# Patient Record
Sex: FEMALE | Race: WHITE | NOT HISPANIC OR LATINO
[De-identification: names, ages, dates, MRNs, and addresses within clinical notes are randomized per-mention and may not be internally consistent; named-entity substitution may affect disease eponyms.]

---

## 2018-07-23 PROBLEM — Z00.00 ENCOUNTER FOR PREVENTIVE HEALTH EXAMINATION: Status: ACTIVE | Noted: 2018-07-23

## 2018-07-24 ENCOUNTER — APPOINTMENT (OUTPATIENT)
Dept: CARDIOLOGY | Facility: CLINIC | Age: 68
End: 2018-07-24
Payer: MEDICARE

## 2018-07-24 VITALS — SYSTOLIC BLOOD PRESSURE: 124 MMHG | HEART RATE: 65 BPM | DIASTOLIC BLOOD PRESSURE: 72 MMHG

## 2018-07-24 DIAGNOSIS — E03.9 HYPOTHYROIDISM, UNSPECIFIED: ICD-10-CM

## 2018-07-24 DIAGNOSIS — E78.5 HYPERLIPIDEMIA, UNSPECIFIED: ICD-10-CM

## 2018-07-24 DIAGNOSIS — E78.1 PURE HYPERGLYCERIDEMIA: ICD-10-CM

## 2018-07-24 DIAGNOSIS — Z78.9 OTHER SPECIFIED HEALTH STATUS: ICD-10-CM

## 2018-07-24 DIAGNOSIS — Z82.49 FAMILY HISTORY OF ISCHEMIC HEART DISEASE AND OTHER DISEASES OF THE CIRCULATORY SYSTEM: ICD-10-CM

## 2018-07-24 PROCEDURE — 93000 ELECTROCARDIOGRAM COMPLETE: CPT

## 2018-07-24 PROCEDURE — 99204 OFFICE O/P NEW MOD 45 MIN: CPT

## 2018-07-24 RX ORDER — FENOFIBRATE 48 MG/1
48 TABLET ORAL
Qty: 30 | Refills: 5 | Status: ACTIVE | COMMUNITY
Start: 2018-07-24

## 2018-07-24 RX ORDER — ATORVASTATIN CALCIUM 40 MG/1
40 TABLET, FILM COATED ORAL DAILY
Qty: 90 | Refills: 3 | Status: ACTIVE | COMMUNITY
Start: 2018-07-24

## 2018-07-24 RX ORDER — LEVOTHYROXINE SODIUM 0.05 MG/1
50 TABLET ORAL
Qty: 30 | Refills: 5 | Status: ACTIVE | COMMUNITY
Start: 2018-07-24

## 2018-08-22 ENCOUNTER — APPOINTMENT (OUTPATIENT)
Dept: CARDIOLOGY | Facility: CLINIC | Age: 68
End: 2018-08-22
Payer: MEDICARE

## 2018-08-22 PROCEDURE — 93306 TTE W/DOPPLER COMPLETE: CPT

## 2018-09-11 ENCOUNTER — APPOINTMENT (OUTPATIENT)
Dept: CARDIOLOGY | Facility: CLINIC | Age: 68
End: 2018-09-11
Payer: MEDICARE

## 2018-09-11 VITALS — SYSTOLIC BLOOD PRESSURE: 123 MMHG | HEART RATE: 68 BPM | DIASTOLIC BLOOD PRESSURE: 71 MMHG

## 2018-09-11 DIAGNOSIS — R06.02 SHORTNESS OF BREATH: ICD-10-CM

## 2018-09-11 DIAGNOSIS — R94.31 ABNORMAL ELECTROCARDIOGRAM [ECG] [EKG]: ICD-10-CM

## 2018-09-11 DIAGNOSIS — E78.5 HYPERLIPIDEMIA, UNSPECIFIED: ICD-10-CM

## 2018-09-11 PROCEDURE — 99214 OFFICE O/P EST MOD 30 MIN: CPT

## 2018-09-21 ENCOUNTER — APPOINTMENT (OUTPATIENT)
Dept: CARDIOLOGY | Facility: CLINIC | Age: 68
End: 2018-09-21
Payer: MEDICARE

## 2018-09-21 PROCEDURE — 78452 HT MUSCLE IMAGE SPECT MULT: CPT

## 2018-09-21 PROCEDURE — 93015 CV STRESS TEST SUPVJ I&R: CPT

## 2018-09-21 PROCEDURE — A9500: CPT

## 2019-02-05 ENCOUNTER — APPOINTMENT (OUTPATIENT)
Dept: OBGYN | Facility: CLINIC | Age: 69
End: 2019-02-05
Payer: MEDICARE

## 2019-02-05 VITALS
DIASTOLIC BLOOD PRESSURE: 72 MMHG | SYSTOLIC BLOOD PRESSURE: 124 MMHG | WEIGHT: 169.25 LBS | HEIGHT: 67 IN | BODY MASS INDEX: 26.56 KG/M2

## 2019-02-05 DIAGNOSIS — N95.2 POSTMENOPAUSAL ATROPHIC VAGINITIS: ICD-10-CM

## 2019-02-05 DIAGNOSIS — Z87.42 PERSONAL HISTORY OF OTHER DISEASES OF THE FEMALE GENITAL TRACT: ICD-10-CM

## 2019-02-05 PROCEDURE — 99203 OFFICE O/P NEW LOW 30 MIN: CPT

## 2019-02-05 PROCEDURE — 82270 OCCULT BLOOD FECES: CPT

## 2019-02-05 NOTE — CHIEF COMPLAINT
[Initial Visit] : initial GYN visit [FreeTextEntry1] : Patient is a 68-year-old female who presents with complaints of an episode of light vaginal bleeding. Patient states the bleeding has stopped. This occurred approximately 2 weeks ago. Patient's last GYN exam was in the year 2000. Patient has no other complaints. Patient's last mammogram was July of 2018. And last bone density was years ago. In view of this complaint the patient was advised to undergo a workup and evaluation including a pelvic ultrasound, and office hysteroscopy and endometrial biopsy. And further treatment pending test results. Patient is agreeable and will be scheduled. Risks benefits and alternatives reviewed

## 2019-02-06 RX ORDER — MISOPROSTOL 200 UG/1
200 TABLET ORAL
Qty: 2 | Refills: 0 | Status: COMPLETED | COMMUNITY
Start: 2019-02-06 | End: 2019-02-08

## 2019-02-08 ENCOUNTER — FORM ENCOUNTER (OUTPATIENT)
Age: 69
End: 2019-02-08

## 2019-02-09 ENCOUNTER — APPOINTMENT (OUTPATIENT)
Dept: RADIOLOGY | Facility: CLINIC | Age: 69
End: 2019-02-09
Payer: MEDICARE

## 2019-02-09 ENCOUNTER — APPOINTMENT (OUTPATIENT)
Dept: ULTRASOUND IMAGING | Facility: CLINIC | Age: 69
End: 2019-02-09
Payer: MEDICARE

## 2019-02-09 ENCOUNTER — OUTPATIENT (OUTPATIENT)
Dept: OUTPATIENT SERVICES | Facility: HOSPITAL | Age: 69
LOS: 1 days | End: 2019-02-09
Payer: MEDICARE

## 2019-02-09 DIAGNOSIS — N95.2 POSTMENOPAUSAL ATROPHIC VAGINITIS: ICD-10-CM

## 2019-02-09 PROCEDURE — 77080 DXA BONE DENSITY AXIAL: CPT | Mod: 26

## 2019-02-09 PROCEDURE — 76856 US EXAM PELVIC COMPLETE: CPT | Mod: 26

## 2019-02-09 PROCEDURE — 76856 US EXAM PELVIC COMPLETE: CPT

## 2019-02-09 PROCEDURE — 76830 TRANSVAGINAL US NON-OB: CPT | Mod: 26

## 2019-02-09 PROCEDURE — 77080 DXA BONE DENSITY AXIAL: CPT

## 2019-02-09 PROCEDURE — 76830 TRANSVAGINAL US NON-OB: CPT

## 2019-02-11 ENCOUNTER — RESULT REVIEW (OUTPATIENT)
Age: 69
End: 2019-02-11

## 2019-02-11 LAB — CYTOLOGY CVX/VAG DOC THIN PREP: NORMAL

## 2019-03-05 ENCOUNTER — APPOINTMENT (OUTPATIENT)
Dept: OBGYN | Facility: CLINIC | Age: 69
End: 2019-03-05
Payer: MEDICARE

## 2019-03-05 VITALS
TEMPERATURE: 97.6 F | DIASTOLIC BLOOD PRESSURE: 80 MMHG | HEIGHT: 67 IN | SYSTOLIC BLOOD PRESSURE: 140 MMHG | WEIGHT: 169 LBS | BODY MASS INDEX: 26.53 KG/M2

## 2019-03-05 DIAGNOSIS — N95.0 POSTMENOPAUSAL BLEEDING: ICD-10-CM

## 2019-03-05 DIAGNOSIS — R93.5 ABNORMAL FINDINGS ON DIAGNOSTIC IMAGING OF OTHER ABDOMINAL REGIONS, INCLUDING RETROPERITONEUM: ICD-10-CM

## 2019-03-05 DIAGNOSIS — Z87.42 PERSONAL HISTORY OF OTHER DISEASES OF THE FEMALE GENITAL TRACT: ICD-10-CM

## 2019-03-05 PROCEDURE — 58558Z: CUSTOM

## 2019-03-05 NOTE — PROCEDURE
[Endometrial Biopsy] : Endometrial biopsy [Abnormal US] : abnormal ultrasound findings [Post-Menop. Bleeding] : post-menopausal bleeding [Risks] : risks [Benefits] : benefits [Alternatives] : alternatives [Patient] : patient [Infection] : infection [Bleeding] : bleeding [Allergic Reaction] : allergic reaction [Uterine Perforation] : uterine perforation [Pain] : pain [CONSENT OBTAINED] : written consent was obtained prior to the procedure. [Neg Pregnancy Test] : a pregnancy test was negative [Betadine] : Betadine [Paracervical Block] : A paracervical block was performed using [___ mL Injected] : [unfilled] ~UmL [1%] : 1% [Without Epi] : without epinephrine [Tenaculum] : a single toothed tenaculum [Required Dilation] : required dilation [Anteverted] : anteverted [Pipelle] : a Pipelle endometrial suction curette [Scant] : a scant [Sent to Histology] : the specimen was place in buffered formalin and sent for pathlogy [Tolerated Well] : the patient tolerated the procedure well [No Complications] : there were no complications [de-identified] : Cytotec [de-identified] : further treatment pending test results

## 2019-03-12 ENCOUNTER — TRANSCRIPTION ENCOUNTER (OUTPATIENT)
Age: 69
End: 2019-03-12

## 2019-03-12 ENCOUNTER — APPOINTMENT (OUTPATIENT)
Dept: OBGYN | Facility: CLINIC | Age: 69
End: 2019-03-12
Payer: MEDICARE

## 2019-03-12 VITALS
SYSTOLIC BLOOD PRESSURE: 110 MMHG | DIASTOLIC BLOOD PRESSURE: 70 MMHG | WEIGHT: 169 LBS | HEIGHT: 67 IN | BODY MASS INDEX: 26.53 KG/M2

## 2019-03-12 DIAGNOSIS — M85.851 OTHER SPECIFIED DISORDERS OF BONE DENSITY AND STRUCTURE, RIGHT THIGH: ICD-10-CM

## 2019-03-12 DIAGNOSIS — M85.852 OTHER SPECIFIED DISORDERS OF BONE DENSITY AND STRUCTURE, RIGHT THIGH: ICD-10-CM

## 2019-03-12 PROCEDURE — 99214 OFFICE O/P EST MOD 30 MIN: CPT

## 2019-03-12 NOTE — CHIEF COMPLAINT
[Follow Up] : follow up GYN visit [FreeTextEntry1] : Patient is a 68-year-old female who presents for consultation to discuss her bone density DEXA results. Patient's T score and the spine was -0.1 which is considered to be normal. Patient's T score and the left hip was -2.3 in the femoral neck and -2.4 total hip area it is consistent with severe osteopenia. These results were discussed with the patient in detail and included the following recommendations: Adequate calcium and vitamin D supplementation, weight-bearing exercise, fall prevention, bone metabolic lab evaluation, and consideration for medical therapy with a bisphosphonate. Patient's FRAX calculation is 13% for a 10 year major fracture and 2.9% her hip fracture. Discuss these numbers as well. Patient did decide on whether or not she would like to start medical therapy. Instructions and precautions reviewed, followup bone density recommended in one to 2 years.\par \par 30 minutes face time

## 2019-03-12 NOTE — COUNSELING
[Nutrition] : nutrition [Exercise] : exercise [Vitamins/Supplements] : vitamins/supplements [Lab Results] : lab results [Medication Management] : medication management

## 2019-03-13 ENCOUNTER — TRANSCRIPTION ENCOUNTER (OUTPATIENT)
Age: 69
End: 2019-03-13

## 2019-03-13 LAB
25(OH)D3 SERPL-MCNC: 12.5 NG/ML
ANION GAP SERPL CALC-SCNC: 11 MMOL/L
BUN SERPL-MCNC: 20 MG/DL
CALCIUM SERPL-MCNC: 9.5 MG/DL
CHLORIDE SERPL-SCNC: 103 MMOL/L
CO2 SERPL-SCNC: 27 MMOL/L
CREAT SERPL-MCNC: 0.89 MG/DL
GLUCOSE SERPL-MCNC: 98 MG/DL
POTASSIUM SERPL-SCNC: 4.7 MMOL/L
SODIUM SERPL-SCNC: 141 MMOL/L
TSH SERPL-ACNC: 6.05 UIU/ML

## 2019-03-18 ENCOUNTER — TRANSCRIPTION ENCOUNTER (OUTPATIENT)
Age: 69
End: 2019-03-18

## 2019-03-18 LAB — HUNTINGTON HOSPITAL LAB BIOPSY: NORMAL

## 2019-03-19 ENCOUNTER — RX RENEWAL (OUTPATIENT)
Age: 69
End: 2019-03-19

## 2019-03-21 LAB — PTH RELATED PROT SERPL-MCNC: <2 PMOL/L

## 2019-03-22 ENCOUNTER — FORM ENCOUNTER (OUTPATIENT)
Age: 69
End: 2019-03-22

## 2019-03-23 ENCOUNTER — OUTPATIENT (OUTPATIENT)
Dept: OUTPATIENT SERVICES | Facility: HOSPITAL | Age: 69
LOS: 1 days | End: 2019-03-23
Payer: MEDICARE

## 2019-03-23 ENCOUNTER — APPOINTMENT (OUTPATIENT)
Dept: MRI IMAGING | Facility: CLINIC | Age: 69
End: 2019-03-23
Payer: MEDICARE

## 2019-03-23 DIAGNOSIS — C54.1 MALIGNANT NEOPLASM OF ENDOMETRIUM: ICD-10-CM

## 2019-03-23 DIAGNOSIS — Z00.8 ENCOUNTER FOR OTHER GENERAL EXAMINATION: ICD-10-CM

## 2019-03-23 PROCEDURE — A9585: CPT

## 2019-03-23 PROCEDURE — 72197 MRI PELVIS W/O & W/DYE: CPT

## 2019-03-23 PROCEDURE — 72197 MRI PELVIS W/O & W/DYE: CPT | Mod: 26

## 2019-03-25 ENCOUNTER — RX RENEWAL (OUTPATIENT)
Age: 69
End: 2019-03-25

## 2019-03-26 ENCOUNTER — CLINICAL ADVICE (OUTPATIENT)
Age: 69
End: 2019-03-26

## 2019-03-27 ENCOUNTER — APPOINTMENT (OUTPATIENT)
Dept: GYNECOLOGIC ONCOLOGY | Facility: CLINIC | Age: 69
End: 2019-03-27
Payer: MEDICARE

## 2019-03-27 DIAGNOSIS — Z80.3 FAMILY HISTORY OF MALIGNANT NEOPLASM OF BREAST: ICD-10-CM

## 2019-03-27 DIAGNOSIS — Z82.49 FAMILY HISTORY OF ISCHEMIC HEART DISEASE AND OTHER DISEASES OF THE CIRCULATORY SYSTEM: ICD-10-CM

## 2019-03-27 PROCEDURE — 99205 OFFICE O/P NEW HI 60 MIN: CPT

## 2019-03-27 NOTE — CHIEF COMPLAINT
[FreeTextEntry1] : [place] [facility]\par \par [facility] [phone] at [address] [place] [address]. [place] [id number]

## 2019-03-27 NOTE — ASSESSMENT
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 Physical examination significant for leukoplakia noted near perianal area on perianal body, at 12 o'clock and between 7-8 o'clock measuring 5mm. There was an associated loss of labia minora noted suspicious for lichen sclerosis. Internal examination was normal.\par \par I discussed at length with the patient the nature, purpose, risks, benefits, and alternatives of Robot assisted total laparoscopic hysterectomy with bilateral salpingo-oophorectomy, retroperitoneal lymphadenectomy.  The patient understands the risks to include,but not be limited to, bowel injury, bleeding (with the possible need for transfusion), bladder or ureteral injury, infections, deep venous thrombosis, and que-operative death.  The patient also understands that her surgery may not be able to be performed robotically and that she may need a laparotomy.  She also understands the limitations of robotic surgery and the possibility of missing a surgical complication with need for subsequent re-exploration.  She agrees to proceed.  She asked numerous questions which were answered to her satisfaction.  She understands the need for a pre-operative bowel preparation and agrees to comply with our instructions.  She also understands the rationale for a cystoscopy at the completion of the procedure and the potential risks of cystoscopy.  The patient also understands the possible limitations of robotic staging compared to a laparotomy approach.\par \par I also discussed given her physical exam findings significant for lichen sclerosis and leukoplakia, I am concerned that there is a possibility of being at risk for a vulvar cancer, for which I am recommending wide local excision and possible vulvar biopsies. With this procedure I discussed the risk of bleeding, post operative pain and infection. \par

## 2019-03-27 NOTE — HISTORY OF PRESENT ILLNESS
[FreeTextEntry1] : This 69 y/o  female, LMP at age 53 being referred by Dr. Campuzano for abnormal EMB pathology. Patient presented in 2019 with a episode of light vaginal bleeding in the last week of 2018 for a few days with some mild associated cramping. 2019 EMB significant for moderately differentiated adenocarcinoma. P16 positive and Vimentin, ER, [place] negative. These findings favor an endocervical primary over an endometrial primary. Recommend clinical correlation. Patient denies changes in her bowels/bladder habits. MRI pelvis revealed small amount of fluid within the endometrial cavity, no endometrial thickening is evident.  Uterus measuring 6.7 x 3.4 x 4.4cm. Denies changes in her bowel/bladder habits. \par \par PAP – 2019 Negative for intraepithelial lesion or malignancy, atrophy \par Mammogram - 2018; normal as per patient \par Colonoscopy - < 5 years ago; normal as per patient \par Family History significant for breast cancer in her sister, who passed away at age 48.

## 2019-03-27 NOTE — PHYSICAL EXAM
[Normal] : Bimanual Exam: Normal [Abnormal] : External genitalia: Abnormal [de-identified] : leukoplakia noted near perianal area on perianal body, at 12 o'clock and between 7-8 o'clock measuring 5mm,\par loss of labia minora noted suspicious for lichen sclerosis   [de-identified] : Patient was interviewed and examined with chaperone present. Name of Chaperone: Sania Mccarthy PA-C

## 2019-03-27 NOTE — PAST MEDICAL HISTORY
[Postmenopausal] : The patient is postmenopausal [Menarche Age ____] : age at menarche was [unfilled] [Regular Cycle Intervals] : have been regular

## 2019-04-16 ENCOUNTER — OUTPATIENT (OUTPATIENT)
Dept: OUTPATIENT SERVICES | Facility: HOSPITAL | Age: 69
LOS: 1 days | End: 2019-04-16
Payer: MEDICARE

## 2019-04-16 VITALS
SYSTOLIC BLOOD PRESSURE: 118 MMHG | WEIGHT: 169.76 LBS | HEART RATE: 58 BPM | RESPIRATION RATE: 16 BRPM | HEIGHT: 66 IN | DIASTOLIC BLOOD PRESSURE: 68 MMHG | TEMPERATURE: 96 F

## 2019-04-16 DIAGNOSIS — Z90.89 ACQUIRED ABSENCE OF OTHER ORGANS: Chronic | ICD-10-CM

## 2019-04-16 DIAGNOSIS — E78.00 PURE HYPERCHOLESTEROLEMIA, UNSPECIFIED: ICD-10-CM

## 2019-04-16 DIAGNOSIS — C54.1 MALIGNANT NEOPLASM OF ENDOMETRIUM: ICD-10-CM

## 2019-04-16 DIAGNOSIS — Z01.818 ENCOUNTER FOR OTHER PREPROCEDURAL EXAMINATION: ICD-10-CM

## 2019-04-16 DIAGNOSIS — E03.9 HYPOTHYROIDISM, UNSPECIFIED: ICD-10-CM

## 2019-04-16 DIAGNOSIS — Z96.651 PRESENCE OF RIGHT ARTIFICIAL KNEE JOINT: Chronic | ICD-10-CM

## 2019-04-16 DIAGNOSIS — Z29.9 ENCOUNTER FOR PROPHYLACTIC MEASURES, UNSPECIFIED: ICD-10-CM

## 2019-04-16 DIAGNOSIS — Z98.51 TUBAL LIGATION STATUS: Chronic | ICD-10-CM

## 2019-04-16 LAB
ANION GAP SERPL CALC-SCNC: 12 MMOL/L — SIGNIFICANT CHANGE UP (ref 5–17)
BASOPHILS # BLD AUTO: 0 K/UL — SIGNIFICANT CHANGE UP (ref 0–0.2)
BASOPHILS NFR BLD AUTO: 0.4 % — SIGNIFICANT CHANGE UP (ref 0–2)
BLD GP AB SCN SERPL QL: SIGNIFICANT CHANGE UP
BUN SERPL-MCNC: 20 MG/DL — SIGNIFICANT CHANGE UP (ref 8–20)
CALCIUM SERPL-MCNC: 9.6 MG/DL — SIGNIFICANT CHANGE UP (ref 8.6–10.2)
CANCER AG125 SERPL-ACNC: 9 U/ML — SIGNIFICANT CHANGE UP
CHLORIDE SERPL-SCNC: 103 MMOL/L — SIGNIFICANT CHANGE UP (ref 98–107)
CO2 SERPL-SCNC: 25 MMOL/L — SIGNIFICANT CHANGE UP (ref 22–29)
CREAT SERPL-MCNC: 0.73 MG/DL — SIGNIFICANT CHANGE UP (ref 0.5–1.3)
EOSINOPHIL # BLD AUTO: 0.2 K/UL — SIGNIFICANT CHANGE UP (ref 0–0.5)
EOSINOPHIL NFR BLD AUTO: 4.6 % — SIGNIFICANT CHANGE UP (ref 0–6)
GLUCOSE SERPL-MCNC: 102 MG/DL — SIGNIFICANT CHANGE UP (ref 70–115)
HBA1C BLD-MCNC: 5.1 % — SIGNIFICANT CHANGE UP (ref 4–5.6)
HCT VFR BLD CALC: 41.5 % — SIGNIFICANT CHANGE UP (ref 37–47)
HGB BLD-MCNC: 13.4 G/DL — SIGNIFICANT CHANGE UP (ref 12–16)
LYMPHOCYTES # BLD AUTO: 1.4 K/UL — SIGNIFICANT CHANGE UP (ref 1–4.8)
LYMPHOCYTES # BLD AUTO: 30.6 % — SIGNIFICANT CHANGE UP (ref 20–55)
MCHC RBC-ENTMCNC: 31.8 PG — HIGH (ref 27–31)
MCHC RBC-ENTMCNC: 32.3 G/DL — SIGNIFICANT CHANGE UP (ref 32–36)
MCV RBC AUTO: 98.3 FL — SIGNIFICANT CHANGE UP (ref 81–99)
MONOCYTES # BLD AUTO: 0.3 K/UL — SIGNIFICANT CHANGE UP (ref 0–0.8)
MONOCYTES NFR BLD AUTO: 6.3 % — SIGNIFICANT CHANGE UP (ref 3–10)
NEUTROPHILS # BLD AUTO: 2.8 K/UL — SIGNIFICANT CHANGE UP (ref 1.8–8)
NEUTROPHILS NFR BLD AUTO: 58.1 % — SIGNIFICANT CHANGE UP (ref 37–73)
PLATELET # BLD AUTO: 230 K/UL — SIGNIFICANT CHANGE UP (ref 150–400)
POTASSIUM SERPL-MCNC: 4.5 MMOL/L — SIGNIFICANT CHANGE UP (ref 3.5–5.3)
POTASSIUM SERPL-SCNC: 4.5 MMOL/L — SIGNIFICANT CHANGE UP (ref 3.5–5.3)
RBC # BLD: 4.22 M/UL — LOW (ref 4.4–5.2)
RBC # FLD: 13.4 % — SIGNIFICANT CHANGE UP (ref 11–15.6)
SODIUM SERPL-SCNC: 140 MMOL/L — SIGNIFICANT CHANGE UP (ref 135–145)
TYPE + AB SCN PNL BLD: SIGNIFICANT CHANGE UP
WBC # BLD: 4.7 K/UL — LOW (ref 4.8–10.8)
WBC # FLD AUTO: 4.7 K/UL — LOW (ref 4.8–10.8)

## 2019-04-16 PROCEDURE — 85027 COMPLETE CBC AUTOMATED: CPT

## 2019-04-16 PROCEDURE — 86901 BLOOD TYPING SEROLOGIC RH(D): CPT

## 2019-04-16 PROCEDURE — 93005 ELECTROCARDIOGRAM TRACING: CPT

## 2019-04-16 PROCEDURE — 83036 HEMOGLOBIN GLYCOSYLATED A1C: CPT

## 2019-04-16 PROCEDURE — 86850 RBC ANTIBODY SCREEN: CPT

## 2019-04-16 PROCEDURE — 86304 IMMUNOASSAY TUMOR CA 125: CPT

## 2019-04-16 PROCEDURE — 86900 BLOOD TYPING SEROLOGIC ABO: CPT

## 2019-04-16 PROCEDURE — G0463: CPT

## 2019-04-16 PROCEDURE — 80048 BASIC METABOLIC PNL TOTAL CA: CPT

## 2019-04-16 PROCEDURE — 36415 COLL VENOUS BLD VENIPUNCTURE: CPT

## 2019-04-16 PROCEDURE — 93010 ELECTROCARDIOGRAM REPORT: CPT

## 2019-04-16 NOTE — H&P PST ADULT - MALLAMPATI CLASS
Occupational Therapy Cancellation Notice     OT orders received and chart review completed-Attempted to see pt for OT evaluation today however pt currently declining to participate in therapy session  Pt reported she attempted to get OOB yesterday and stated "it didn't go well " At this time pt would like to visit w/ her family and does not want therapy services  Will continue to follow and attempt to see pt at another time when agreeable      Sandee Larios MS, OTR/L Class II - visualization of the soft palate, fauces, and uvula

## 2019-04-16 NOTE — H&P PST ADULT - NSICDXFAMILYHX_GEN_ALL_CORE_FT
FAMILY HISTORY:  Family history of hypertension  FH: breast cancer, sister  FH: lymphoma, father and brother

## 2019-04-16 NOTE — H&P PST ADULT - ASSESSMENT
medications reviewed, instructions given on what medications to take and what not to take.   CAPRINI VTE 2.0 SCORE [CLOT updated 2019]    AGE RELATED RISK FACTORS                                                       MOBILITY RELATED FACTORS  [ ] Age 41-60 years                                            (1 Point)                    [ ] Bed rest                                                        (1 Point)  [x ] Age: 61-74 years                                           (2 Points)                  [ ] Plaster cast                                                   (2 Points)  [ ] Age= 75 years                                              (3 Points)                    [ ] Bed bound for more than 72 hours                 (2 Points)  x  DISEASE RELATED RISK FACTORS                                               GENDER SPECIFIC FACTORS  [ ] Edema in the lower extremities                       (1 Point)              [ ] Pregnancy                                                     (1 Point)  [ ] Varicose veins                                               (1 Point)                     [ ] Post-partum < 6 weeks                                   (1 Point)             [x ] BMI > 25 Kg/m2                                            (1 Point)                     [ ] Hormonal therapy  or oral contraception          (1 Point)                 [ ] Sepsis (in the previous month)                        (1 Point)               [ ] History of pregnancy complications                 (1 point)  [ ] Pneumonia or serious lung disease                                               [ ] Unexplained or recurrent                     (1 Point)           (in the previous month)                               (1 Point)  [ ] Abnormal pulmonary function test                     (1 Point)                 SURGERY RELATED RISK FACTORS  [ ] Acute myocardial infarction                              (1 Point)               [ ]  Section                                             (1 Point)  [ ] Congestive heart failure (in the previous month)  (1 Point)      [ ] Minor surgery                                                  (1 Point)   [ ] Inflammatory bowel disease                             (1 Point)               [ ] Arthroscopic surgery                                        (2 Points)  [ ] Central venous access                                      (2 Points)                [x ] General surgery lasting more than 45 minutes (2 points)  [ x] Malignancy- Present or previous                   (2 Points)                [ ] Elective arthroplasty                                         (5 points)    [ ] Stroke (in the previous month)                          (5 Points)                                                                                                                                                           HEMATOLOGY RELATED FACTORS                                                 TRAUMA RELATED RISK FACTORS  [ ] Prior episodes of VTE                                     (3 Points)                [ ] Fracture of the hip, pelvis, or leg                       (5 Points)  [ ] Positive family history for VTE                         (3 Points)             [ ] Acute spinal cord injury (in the previous month)  (5 Points)  [ ] Prothrombin 30505 A                                     (3 Points)               [ ] Paralysis  (less than 1 month)                             (5 Points)  [ ] Factor V Leiden                                             (3 Points)                  [ ] Multiple Trauma within 1 month                        (5 Points)  [ ] Lupus anticoagulants                                     (3 Points)                                                           [ ] Anticardiolipin antibodies                               (3 Points)                                                       [ ] High homocysteine in the blood                      (3 Points)                                             [ ] Other congenital or acquired thrombophilia      (3 Points)                                                [ ] Heparin induced thrombocytopenia                  (3 Points)                                     Total Score [  6        ]  OPIOID RISK TOOL    NATASHA EACH BOX THAT APPLIES AND ADD TOTALS AT THE END    FAMILY HISTORY OF SUBSTANCE ABUSE                 FEMALE         MALE                                                Alcohol                             [  ]1 pt          [  ]3pts                                               Illegal Drugs                     [  ]2 pts        [  ]3pts                                               Rx Drugs                           [  ]4 pts        [  ]4 pts    PERSONAL HISTORY OF SUBSTANCE ABUSE                                                                                          Alcohol                             [  ]3 pts       [  ]3 pts                                               Illegal Drugs                     [  ]4 pts        [  ]4 pts                                               Rx Drugs                           [  ]5 pts        [  ]5 pts    AGE BETWEEN 16-45 YEARS                                      [  ]1 pt         [  ]1 pt    HISTORY OF PREADOLESCENT   SEXUAL ABUSE                                                             [  ]3 pts        [  ]0pts    PSYCHOLOGICAL DISEASE                     ADD, OCD, Bipolar, Schizophrenia        [  ]2 pts         [  ]2 pts                      Depression                                               [  ]1 pt           [  ]1 pt           SCORING TOTAL   (add numbers and type here)              ( 0)                                     A score of 3 or lower indicated LOW risk for future opioid abuse  A score of 4 to 7 indicated moderate risk for future opioid abuse  A score of 8 or higher indicates a high risk for opioid abuse

## 2019-04-16 NOTE — H&P PST ADULT - NSICDXPROBLEM_GEN_ALL_CORE_FT
PROBLEM DIAGNOSES  Problem: Need for prophylactic measure  Assessment and Plan: Caprini Score 6 High risk,  Surgical team should assess /Strongly recommend pharmacological and mechanical measures for VTE prophylaxis     Problem: Hypothyroidism  Assessment and Plan: continue medications as instructed.     Problem: High cholesterol  Assessment and Plan: continue medications as instructed.     Problem: Endometrium cancer  Assessment and Plan: Robotic assisted total laparoscopic hysterectomy bilateral salpingo oophorectomy sentinel lymph node dissections cystoscopy. Medical Clearance pending

## 2019-04-16 NOTE — H&P PST ADULT - NSICDXPASTSURGICALHX_GEN_ALL_CORE_FT
PAST SURGICAL HISTORY:  H/O total knee replacement, right 2015    H/O tubal ligation     History of tonsillectomy

## 2019-04-16 NOTE — H&P PST ADULT - HISTORY OF PRESENT ILLNESS
70 year old female 68 year old female who states that she has endometrial cancer which was diagnosed in early Jan 2019, she noticed some post menopausal bloody discharge in late December which leads to all the work up.

## 2019-04-16 NOTE — H&P PST ADULT - NSANTHOSAYNRD_GEN_A_CORE
No. MIKO screening performed.  STOP BANG Legend: 0-2 = LOW Risk; 3-4 = INTERMEDIATE Risk; 5-8 = HIGH Risk

## 2019-04-23 ENCOUNTER — RESULT REVIEW (OUTPATIENT)
Age: 69
End: 2019-04-23

## 2019-04-23 ENCOUNTER — OUTPATIENT (OUTPATIENT)
Dept: INPATIENT UNIT | Facility: HOSPITAL | Age: 69
LOS: 1 days | End: 2019-04-23
Payer: MEDICARE

## 2019-04-23 VITALS
HEART RATE: 67 BPM | HEIGHT: 67 IN | TEMPERATURE: 98 F | DIASTOLIC BLOOD PRESSURE: 72 MMHG | OXYGEN SATURATION: 99 % | WEIGHT: 169.98 LBS | SYSTOLIC BLOOD PRESSURE: 125 MMHG | RESPIRATION RATE: 16 BRPM

## 2019-04-23 DIAGNOSIS — Z90.89 ACQUIRED ABSENCE OF OTHER ORGANS: Chronic | ICD-10-CM

## 2019-04-23 DIAGNOSIS — Z98.51 TUBAL LIGATION STATUS: Chronic | ICD-10-CM

## 2019-04-23 DIAGNOSIS — C54.1 MALIGNANT NEOPLASM OF ENDOMETRIUM: ICD-10-CM

## 2019-04-23 DIAGNOSIS — Z96.651 PRESENCE OF RIGHT ARTIFICIAL KNEE JOINT: Chronic | ICD-10-CM

## 2019-04-23 LAB
ABO RH CONFIRMATION: SIGNIFICANT CHANGE UP
GLUCOSE BLDC GLUCOMTR-MCNC: 124 MG/DL — HIGH (ref 70–99)
GLUCOSE BLDC GLUCOMTR-MCNC: 81 MG/DL — SIGNIFICANT CHANGE UP (ref 70–99)
GLUCOSE BLDC GLUCOMTR-MCNC: 92 MG/DL — SIGNIFICANT CHANGE UP (ref 70–99)

## 2019-04-23 PROCEDURE — 57425 LAPAROSCOPY SURG COLPOPEXY: CPT | Mod: AS

## 2019-04-23 PROCEDURE — 88309 TISSUE EXAM BY PATHOLOGIST: CPT | Mod: 26

## 2019-04-23 PROCEDURE — 58571 TLH W/T/O 250 G OR LESS: CPT | Mod: 59

## 2019-04-23 PROCEDURE — 38572 LAPAROSCOPY LYMPHADENECTOMY: CPT | Mod: AS,59

## 2019-04-23 PROCEDURE — 88341 IMHCHEM/IMCYTCHM EA ADD ANTB: CPT | Mod: 26

## 2019-04-23 PROCEDURE — 58571 TLH W/T/O 250 G OR LESS: CPT | Mod: AS,59

## 2019-04-23 PROCEDURE — 88342 IMHCHEM/IMCYTCHM 1ST ANTB: CPT | Mod: 26

## 2019-04-23 PROCEDURE — 38900 IO MAP OF SENT LYMPH NODE: CPT | Mod: AS

## 2019-04-23 PROCEDURE — 57425 LAPAROSCOPY SURG COLPOPEXY: CPT

## 2019-04-23 PROCEDURE — 88305 TISSUE EXAM BY PATHOLOGIST: CPT | Mod: 26

## 2019-04-23 PROCEDURE — 38572 LAPAROSCOPY LYMPHADENECTOMY: CPT | Mod: 59

## 2019-04-23 PROCEDURE — 88307 TISSUE EXAM BY PATHOLOGIST: CPT | Mod: 26

## 2019-04-23 PROCEDURE — 56605 BIOPSY OF VULVA/PERINEUM: CPT | Mod: 59

## 2019-04-23 PROCEDURE — 38900 IO MAP OF SENT LYMPH NODE: CPT | Mod: 50

## 2019-04-23 RX ORDER — ERGOCALCIFEROL 1.25 MG/1
1 CAPSULE ORAL
Qty: 0 | Refills: 0 | COMMUNITY

## 2019-04-23 RX ORDER — OXYCODONE AND ACETAMINOPHEN 5; 325 MG/1; MG/1
1 TABLET ORAL EVERY 4 HOURS
Qty: 0 | Refills: 0 | Status: DISCONTINUED | OUTPATIENT
Start: 2019-04-23 | End: 2019-04-23

## 2019-04-23 RX ORDER — FENOFIBRATE,MICRONIZED 130 MG
1 CAPSULE ORAL
Qty: 0 | Refills: 0 | COMMUNITY

## 2019-04-23 RX ORDER — LEVOTHYROXINE SODIUM 125 MCG
50 TABLET ORAL DAILY
Qty: 0 | Refills: 0 | Status: DISCONTINUED | OUTPATIENT
Start: 2019-04-23 | End: 2019-05-08

## 2019-04-23 RX ORDER — ATORVASTATIN CALCIUM 80 MG/1
40 TABLET, FILM COATED ORAL AT BEDTIME
Qty: 0 | Refills: 0 | Status: DISCONTINUED | OUTPATIENT
Start: 2019-04-23 | End: 2019-05-08

## 2019-04-23 RX ORDER — FENTANYL CITRATE 50 UG/ML
25 INJECTION INTRAVENOUS
Qty: 0 | Refills: 0 | Status: DISCONTINUED | OUTPATIENT
Start: 2019-04-23 | End: 2019-04-23

## 2019-04-23 RX ORDER — DEXTROSE MONOHYDRATE, SODIUM CHLORIDE, AND POTASSIUM CHLORIDE 50; .745; 4.5 G/1000ML; G/1000ML; G/1000ML
1000 INJECTION, SOLUTION INTRAVENOUS
Qty: 0 | Refills: 0 | Status: DISCONTINUED | OUTPATIENT
Start: 2019-04-23 | End: 2019-05-08

## 2019-04-23 RX ORDER — OXYCODONE AND ACETAMINOPHEN 5; 325 MG/1; MG/1
2 TABLET ORAL EVERY 6 HOURS
Qty: 0 | Refills: 0 | Status: DISCONTINUED | OUTPATIENT
Start: 2019-04-23 | End: 2019-04-23

## 2019-04-23 RX ORDER — ONDANSETRON 8 MG/1
4 TABLET, FILM COATED ORAL EVERY 6 HOURS
Qty: 0 | Refills: 0 | Status: DISCONTINUED | OUTPATIENT
Start: 2019-04-23 | End: 2019-05-08

## 2019-04-23 RX ORDER — ONDANSETRON 8 MG/1
4 TABLET, FILM COATED ORAL ONCE
Qty: 0 | Refills: 0 | Status: DISCONTINUED | OUTPATIENT
Start: 2019-04-23 | End: 2019-04-23

## 2019-04-23 RX ORDER — ATORVASTATIN CALCIUM 80 MG/1
1 TABLET, FILM COATED ORAL
Qty: 0 | Refills: 0 | COMMUNITY

## 2019-04-23 RX ORDER — FENOFIBRATE,MICRONIZED 130 MG
48 CAPSULE ORAL DAILY
Qty: 0 | Refills: 0 | Status: DISCONTINUED | OUTPATIENT
Start: 2019-04-23 | End: 2019-05-08

## 2019-04-23 RX ORDER — ENOXAPARIN SODIUM 100 MG/ML
40 INJECTION SUBCUTANEOUS
Qty: 28 | Refills: 0 | OUTPATIENT
Start: 2019-04-23 | End: 2019-05-20

## 2019-04-23 RX ORDER — SODIUM CHLORIDE 9 MG/ML
1000 INJECTION, SOLUTION INTRAVENOUS
Qty: 0 | Refills: 0 | Status: DISCONTINUED | OUTPATIENT
Start: 2019-04-23 | End: 2019-04-23

## 2019-04-23 RX ORDER — HYDROMORPHONE HYDROCHLORIDE 2 MG/ML
0.25 INJECTION INTRAMUSCULAR; INTRAVENOUS; SUBCUTANEOUS
Qty: 0 | Refills: 0 | Status: DISCONTINUED | OUTPATIENT
Start: 2019-04-23 | End: 2019-04-23

## 2019-04-23 RX ORDER — SODIUM CHLORIDE 9 MG/ML
3 INJECTION INTRAMUSCULAR; INTRAVENOUS; SUBCUTANEOUS ONCE
Qty: 0 | Refills: 0 | Status: DISCONTINUED | OUTPATIENT
Start: 2019-04-23 | End: 2019-04-23

## 2019-04-23 RX ORDER — LEVOTHYROXINE SODIUM 125 MCG
1 TABLET ORAL
Qty: 0 | Refills: 0 | COMMUNITY

## 2019-04-23 RX ORDER — ENOXAPARIN SODIUM 100 MG/ML
40 INJECTION SUBCUTANEOUS EVERY 24 HOURS
Qty: 0 | Refills: 0 | Status: DISCONTINUED | OUTPATIENT
Start: 2019-04-24 | End: 2019-05-08

## 2019-04-23 RX ORDER — CEFOTETAN DISODIUM 1 G
2 VIAL (EA) INJECTION ONCE
Qty: 0 | Refills: 0 | Status: COMPLETED | OUTPATIENT
Start: 2019-04-23 | End: 2019-04-23

## 2019-04-23 RX ADMIN — HYDROMORPHONE HYDROCHLORIDE 0.25 MILLIGRAM(S): 2 INJECTION INTRAMUSCULAR; INTRAVENOUS; SUBCUTANEOUS at 21:30

## 2019-04-23 RX ADMIN — HYDROMORPHONE HYDROCHLORIDE 0.25 MILLIGRAM(S): 2 INJECTION INTRAMUSCULAR; INTRAVENOUS; SUBCUTANEOUS at 20:59

## 2019-04-23 RX ADMIN — HYDROMORPHONE HYDROCHLORIDE 0.25 MILLIGRAM(S): 2 INJECTION INTRAMUSCULAR; INTRAVENOUS; SUBCUTANEOUS at 21:16

## 2019-04-23 RX ADMIN — OXYCODONE AND ACETAMINOPHEN 1 TABLET(S): 5; 325 TABLET ORAL at 21:27

## 2019-04-23 RX ADMIN — Medication 100 GRAM(S): at 17:56

## 2019-04-23 NOTE — PROGRESS NOTE ADULT - ASSESSMENT
69yo F with hypothyroid and endometrial cancer, POD 0 s/p RA TLH BSO, PPALND, cysto, no active postoperative issues, recovering well.     Cancer type: Endometrial cancer suspected. Pending final pathology   Cardiac: No cardiac issues, BP well controlled   Pulmonary: no active disease, incentive spirometer at bedside.   Neuro: pain well controlled with current regimen.   Endo: Continue home medications for hypothyroid disease.   GI: Regular diet available, advance at tolerated.   : Rodriguez removed intraoperatively, due to void.   Heme: SCDs when not ambulating, Lovenox for VTE prophylaxis. Continue with AM labs.   FEN: IVF at 125cc/hr, will discontinue once PO intake is adequate. Will continue AM labs.

## 2019-04-23 NOTE — PROGRESS NOTE ADULT - SUBJECTIVE AND OBJECTIVE BOX
GYNECOLOGIC ONCOLOGY PROGRESS NOTE    POD# 0    PROBLEMS:  Hypothyroid  Hyperlipidemia   Endometrial Cancer      Pt seen and examined at bedside.     SUBJECTIVE:    Patient is without complaints.  Pain well-controlled with PRN medications  Flatus: N  Denies Nausea, Vomiting or Diarrhea.  Denies shortness of breath, chest pain or dyspnea on exertion.  NPO since she just recently woke up from anesthesia.   No hemphill in place. Not voiding.       OBJECTIVE:     VITALS:  T(F): 97.2 (04-23-19 @ 19:48), Max: 97.9 (04-23-19 @ 13:25)  HR: 75 (04-23-19 @ 20:45) (67 - 88)  BP: 102/48 (04-23-19 @ 20:45) (102/48 - 128/68)  RR: 13 (04-23-19 @ 20:45) (10 - 17)  SpO2: 98% (04-23-19 @ 20:45) (96% - 100%) on Oxygen at 2L NC  Wt(kg): --    I&O's Summary  No hemphill in place  Not ambulating     MEDICATIONS  (STANDING):  lactated ringers. 1000 milliLiter(s) (125 mL/Hr) IV Continuous <Continuous>    MEDICATIONS  (PRN):  fentaNYL    Injectable 25 MICROGram(s) IV Push every 5 minutes PRN Moderate Pain (4 - 6)  HYDROmorphone  Injectable 0.25 milliGRAM(s) IV Push every 10 minutes PRN Moderate Pain (4 - 6)  ondansetron Injectable 4 milliGRAM(s) IV Push once PRN Nausea and/or Vomiting      Physical Exam:  Constitutional: NAD  Pulmonary: clear to auscultation bilaterally   Cardiovascular: Regular rate and rhythm   Abdomen: soft, non-tender, non-distended, normal bowel signs  Extremities: no lower extremity edema or calve tenderness, Gina's sign negative.  Incision: Clean, dry, intact.  Without signs of infection or hernia.      LABS: Will wait for AM labs.

## 2019-04-24 ENCOUNTER — TRANSCRIPTION ENCOUNTER (OUTPATIENT)
Age: 69
End: 2019-04-24

## 2019-04-24 VITALS
HEART RATE: 55 BPM | TEMPERATURE: 99 F | DIASTOLIC BLOOD PRESSURE: 66 MMHG | SYSTOLIC BLOOD PRESSURE: 110 MMHG | OXYGEN SATURATION: 96 % | RESPIRATION RATE: 18 BRPM

## 2019-04-24 DIAGNOSIS — C54.1 MALIGNANT NEOPLASM OF ENDOMETRIUM: ICD-10-CM

## 2019-04-24 LAB
ANION GAP SERPL CALC-SCNC: 12 MMOL/L — SIGNIFICANT CHANGE UP (ref 5–17)
BUN SERPL-MCNC: 9 MG/DL — SIGNIFICANT CHANGE UP (ref 8–20)
CALCIUM SERPL-MCNC: 8.6 MG/DL — SIGNIFICANT CHANGE UP (ref 8.6–10.2)
CHLORIDE SERPL-SCNC: 106 MMOL/L — SIGNIFICANT CHANGE UP (ref 98–107)
CO2 SERPL-SCNC: 22 MMOL/L — SIGNIFICANT CHANGE UP (ref 22–29)
CREAT SERPL-MCNC: 0.65 MG/DL — SIGNIFICANT CHANGE UP (ref 0.5–1.3)
EOSINOPHIL # BLD AUTO: 0 K/UL — SIGNIFICANT CHANGE UP (ref 0–0.5)
EOSINOPHIL NFR BLD AUTO: 0 % — SIGNIFICANT CHANGE UP (ref 0–6)
GLUCOSE SERPL-MCNC: 170 MG/DL — HIGH (ref 70–115)
HCT VFR BLD CALC: 38.3 % — SIGNIFICANT CHANGE UP (ref 37–47)
HGB BLD-MCNC: 12.4 G/DL — SIGNIFICANT CHANGE UP (ref 12–16)
LYMPHOCYTES # BLD AUTO: 0.5 K/UL — LOW (ref 1–4.8)
LYMPHOCYTES # BLD AUTO: 8 % — LOW (ref 20–55)
MAGNESIUM SERPL-MCNC: 1.9 MG/DL — SIGNIFICANT CHANGE UP (ref 1.6–2.6)
MCHC RBC-ENTMCNC: 31.6 PG — HIGH (ref 27–31)
MCHC RBC-ENTMCNC: 32.4 G/DL — SIGNIFICANT CHANGE UP (ref 32–36)
MCV RBC AUTO: 97.5 FL — SIGNIFICANT CHANGE UP (ref 81–99)
MONOCYTES # BLD AUTO: 0.3 K/UL — SIGNIFICANT CHANGE UP (ref 0–0.8)
MONOCYTES NFR BLD AUTO: 4.3 % — SIGNIFICANT CHANGE UP (ref 3–10)
NEUTROPHILS # BLD AUTO: 5.5 K/UL — SIGNIFICANT CHANGE UP (ref 1.8–8)
NEUTROPHILS NFR BLD AUTO: 87.5 % — HIGH (ref 37–73)
PLATELET # BLD AUTO: 194 K/UL — SIGNIFICANT CHANGE UP (ref 150–400)
POTASSIUM SERPL-MCNC: 5.1 MMOL/L — SIGNIFICANT CHANGE UP (ref 3.5–5.3)
POTASSIUM SERPL-SCNC: 5.1 MMOL/L — SIGNIFICANT CHANGE UP (ref 3.5–5.3)
RBC # BLD: 3.93 M/UL — LOW (ref 4.4–5.2)
RBC # FLD: 13.3 % — SIGNIFICANT CHANGE UP (ref 11–15.6)
SODIUM SERPL-SCNC: 140 MMOL/L — SIGNIFICANT CHANGE UP (ref 135–145)
WBC # BLD: 6.3 K/UL — SIGNIFICANT CHANGE UP (ref 4.8–10.8)
WBC # FLD AUTO: 6.3 K/UL — SIGNIFICANT CHANGE UP (ref 4.8–10.8)

## 2019-04-24 PROCEDURE — S2900: CPT

## 2019-04-24 PROCEDURE — 83735 ASSAY OF MAGNESIUM: CPT

## 2019-04-24 PROCEDURE — 56605 BIOPSY OF VULVA/PERINEUM: CPT

## 2019-04-24 PROCEDURE — 85027 COMPLETE CBC AUTOMATED: CPT

## 2019-04-24 PROCEDURE — 88342 IMHCHEM/IMCYTCHM 1ST ANTB: CPT

## 2019-04-24 PROCEDURE — 38572 LAPAROSCOPY LYMPHADENECTOMY: CPT | Mod: 50

## 2019-04-24 PROCEDURE — 88307 TISSUE EXAM BY PATHOLOGIST: CPT

## 2019-04-24 PROCEDURE — 82962 GLUCOSE BLOOD TEST: CPT

## 2019-04-24 PROCEDURE — 80048 BASIC METABOLIC PNL TOTAL CA: CPT

## 2019-04-24 PROCEDURE — 36415 COLL VENOUS BLD VENIPUNCTURE: CPT

## 2019-04-24 PROCEDURE — 57283 COLPOPEXY INTRAPERITONEAL: CPT

## 2019-04-24 PROCEDURE — 88305 TISSUE EXAM BY PATHOLOGIST: CPT

## 2019-04-24 PROCEDURE — 88309 TISSUE EXAM BY PATHOLOGIST: CPT

## 2019-04-24 PROCEDURE — 58571 TLH W/T/O 250 G OR LESS: CPT

## 2019-04-24 PROCEDURE — 88341 IMHCHEM/IMCYTCHM EA ADD ANTB: CPT

## 2019-04-24 RX ORDER — DOCUSATE SODIUM 100 MG
1 CAPSULE ORAL
Qty: 15 | Refills: 0 | OUTPATIENT
Start: 2019-04-24 | End: 2019-04-28

## 2019-04-24 RX ADMIN — Medication 500 MILLIGRAM(S): at 06:21

## 2019-04-24 RX ADMIN — ENOXAPARIN SODIUM 40 MILLIGRAM(S): 100 INJECTION SUBCUTANEOUS at 06:20

## 2019-04-24 RX ADMIN — Medication 500 MILLIGRAM(S): at 06:23

## 2019-04-24 RX ADMIN — Medication 50 MICROGRAM(S): at 06:21

## 2019-04-24 RX ADMIN — DEXTROSE MONOHYDRATE, SODIUM CHLORIDE, AND POTASSIUM CHLORIDE 125 MILLILITER(S): 50; .745; 4.5 INJECTION, SOLUTION INTRAVENOUS at 00:10

## 2019-04-24 NOTE — PROGRESS NOTE ADULT - SUBJECTIVE AND OBJECTIVE BOX
GYNECOLOGIC ONCOLOGY PROGRESS NOTE    POD#1    PROBLEMS:  Endometrial Cancer, hypothyroid, HLD.     Pt seen and examined at bedside.     SUBJECTIVE:    Patient is without complaints.  Pain well-controlled.  Denies Nausea, Vomiting or Diarrhea.  Denies shortness of breath, chest pain or dyspnea on exertion.  Tolerating diet.  Denies dizziness, weakness or fatigue.     OBJECTIVE:     VITALS:  T(F): 97.7 (04-24-19 @ 04:16), Max: 98.2 (04-24-19 @ 00:07)  HR: 52 (04-24-19 @ 04:16) (52 - 88)  BP: 112/56 (04-24-19 @ 04:16) (102/48 - 128/68)  RR: 18 (04-24-19 @ 04:16) (10 - 18)  SpO2: 96% (04-24-19 @ 04:16) (95% - 100%)    I&O's Summary    Rodriguez removed in OR, voided 450cc's since removal at 20:30.     MEDICATIONS  (STANDING):  atorvastatin 40 milliGRAM(s) Oral at bedtime  dextrose 5% + sodium chloride 0.45% with potassium chloride 20 mEq/L 1000 milliLiter(s) (125 mL/Hr) IV Continuous <Continuous>  enoxaparin Injectable 40 milliGRAM(s) SubCutaneous every 24 hours  fenofibrate Tablet 48 milliGRAM(s) Oral daily  levothyroxine 50 MICROGram(s) Oral daily  naproxen 500 milliGRAM(s) Oral every 12 hours    MEDICATIONS  (PRN):  ondansetron Injectable 4 milliGRAM(s) IV Push every 6 hours PRN Postoperative Nausea and/or Vomiting  oxyCODONE    5 mG/acetaminophen 325 mG 1 Tablet(s) Oral every 4 hours PRN Moderate Pain (4 - 6)  oxyCODONE    5 mG/acetaminophen 325 mG 2 Tablet(s) Oral every 6 hours PRN Severe Pain (7 - 10)      Physical Exam:  Constitutional: NAD  Pulmonary: clear to auscultation bilaterally   Cardiovascular: Regular rate and rhythm   Abdomen: soft, non-tender, non-distended, normal bowel sounds  Extremities: no lower extremity edema or calve tenderness.  Incision: Clean, dry, intact.  Without signs of infection or hernia.      LABS:  AM labs pending.

## 2019-04-24 NOTE — DISCHARGE NOTE PROVIDER - NSDCFUADDAPPT_GEN_ALL_CORE_FT
Follow-up with Dr. Aguilar in two weeks to review pathology report.   Please contact your provider for any pain uncontrolled by medication, excessive bleeding or Fever>100.4  Please take Naprosyn 1 tablet every 12 hours x 4 days, may take percocet as prescribed for breakthrough pain.   Please take Colace as needed to avoid straining with bowel movements.

## 2019-04-24 NOTE — DISCHARGE NOTE PROVIDER - HOSPITAL COURSE
Patient post-operatively had an uncomplicated hospital course. Her pain was well controlled. She is tolerating a regular diet. She is ambulating independently. She was able to void after removal of hemphill. Labs and Vitals WNL upon discharge.

## 2019-04-24 NOTE — DISCHARGE NOTE PROVIDER - CARE PROVIDER_API CALL
Ellen Aguilar)  [facility]  [address] [place] [address]  Phone: [phone]  Fax: [phone]  Follow Up Time:

## 2019-04-24 NOTE — PROGRESS NOTE ADULT - PROBLEM SELECTOR PLAN 1
Dc to home pending normal labs  dc instructions discussed with patient  all questions answered  Scripts for naproxen and percocet provided  Patient will see Dr. Aguilar in office in 2 weeks    All of the above discussed with Dr. Aguilar Dc to home pending normal labs  Dc instructions discussed with patient  All questions answered  Scripts for naproxen and percocet provided  Script for Lovenox x 28 days for DVT prophylaxis  Patient will see Dr. Aguilar in office in 2 weeks    All of the above discussed with Dr. Aguilar

## 2019-04-24 NOTE — DISCHARGE NOTE NURSING/CASE MANAGEMENT/SOCIAL WORK - NSDCDPATPORTLINK_GEN_ALL_CORE
You can access the [facility], offered by [facility], by registering with the following website: [email or web address] [email or web address]

## 2019-04-24 NOTE — DISCHARGE NOTE PROVIDER - INSTRUCTIONS
Continue regular diet as tolerated.    May walk and climb stairs as often as you would like, no vigorous activity, do not lift anything greater than 10lbs, nothing per vagina x 6 weeks, do not drive while on pain medication.

## 2019-04-25 PROBLEM — E78.00 PURE HYPERCHOLESTEROLEMIA, UNSPECIFIED: Chronic | Status: ACTIVE | Noted: 2019-04-16

## 2019-04-25 PROBLEM — E03.9 HYPOTHYROIDISM, UNSPECIFIED: Chronic | Status: ACTIVE | Noted: 2019-04-16

## 2019-04-30 LAB — SURGICAL PATHOLOGY STUDY: SIGNIFICANT CHANGE UP

## 2019-05-08 ENCOUNTER — APPOINTMENT (OUTPATIENT)
Dept: GYNECOLOGIC ONCOLOGY | Facility: CLINIC | Age: 69
End: 2019-05-08
Payer: COMMERCIAL

## 2019-05-08 VITALS — HEIGHT: 67 IN | WEIGHT: 170 LBS | BODY MASS INDEX: 26.68 KG/M2

## 2019-05-08 PROCEDURE — 99024 POSTOP FOLLOW-UP VISIT: CPT

## 2019-05-08 NOTE — ASSESSMENT
[FreeTextEntry1] : Pt is a 69 yo s/p TRH, BSO, SLND for presumed endometrial cancer, which turns out to be an endometioid adenocarcinoma Stage IB. Margins are negative, but after TB presentation recommendation is adjuvant radiation treatment as the parametrial margins are close and she did not have a radical hysterectomy. No evidence of lymphatic spread.

## 2019-05-08 NOTE — DISCUSSION/SUMMARY
[Clean] : was clean [Dry] : was dry [Erythema] : was not erythematous [Intact] : was intact [None] : had no drainage [Ecchymosis] : was not ecchymotic [Seroma] : had no seroma [Normal Skin] : normal appearance [Firm] : soft [Tender] : nontender [Abnormal Bowel Sounds] : normal bowel sounds [Rebound] : no rebound tenderness [Guarding] : no guarding [Incisional Hernia] : no incisional hernia [Mass] : no palpable mass [Doing Well] : is doing well [Excellent Pain Control] : has excellent pain control [No Sign of Infection] : is showing no signs of infection [0] : 0

## 2019-05-08 NOTE — REASON FOR VISIT
[Post Op] : post op visit [de-identified] : TRH, BSO, SLND [de-identified] : 4/23/2019 [de-identified] : Pt presents for first post-op follow up. She reports doing very well overall. She is still doing Lovenox injections. She has a dentist appointment for extraction on the 5/25 but will be done with injections by 22nd. She reports no vaginal bleeding, no pain, no nausea/vomiting.

## 2019-05-08 NOTE — END OF VISIT
[FreeTextEntry3] : RTC in 4 weeks for cuff check\par Referral to Dr. Cleaning for radiation\par Referral to Dr. Shaikh for radiosensitizing chemo

## 2019-05-10 ENCOUNTER — APPOINTMENT (OUTPATIENT)
Dept: RADIATION ONCOLOGY | Facility: CLINIC | Age: 69
End: 2019-05-10
Payer: MEDICARE

## 2019-05-10 ENCOUNTER — OUTPATIENT (OUTPATIENT)
Dept: OUTPATIENT SERVICES | Facility: HOSPITAL | Age: 69
LOS: 1 days | Discharge: HOME | End: 2019-05-10
Payer: MEDICARE

## 2019-05-10 VITALS
TEMPERATURE: 97.8 F | OXYGEN SATURATION: 98 % | HEART RATE: 61 BPM | WEIGHT: 172 LBS | BODY MASS INDEX: 27 KG/M2 | RESPIRATION RATE: 16 BRPM | HEIGHT: 67 IN | DIASTOLIC BLOOD PRESSURE: 67 MMHG | SYSTOLIC BLOOD PRESSURE: 124 MMHG

## 2019-05-10 DIAGNOSIS — Z98.51 TUBAL LIGATION STATUS: Chronic | ICD-10-CM

## 2019-05-10 DIAGNOSIS — Z96.651 PRESENCE OF RIGHT ARTIFICIAL KNEE JOINT: Chronic | ICD-10-CM

## 2019-05-10 DIAGNOSIS — Z80.7 FAMILY HISTORY OF OTHER MALIGNANT NEOPLASMS OF LYMPHOID, HEMATOPOIETIC AND RELATED TISSUES: ICD-10-CM

## 2019-05-10 DIAGNOSIS — Z90.89 ACQUIRED ABSENCE OF OTHER ORGANS: Chronic | ICD-10-CM

## 2019-05-10 PROCEDURE — 99205 OFFICE O/P NEW HI 60 MIN: CPT | Mod: 25,GC

## 2019-05-10 PROCEDURE — 77263 THER RADIOLOGY TX PLNG CPLX: CPT

## 2019-05-10 NOTE — PHYSICAL EXAM
[Bowel Sounds] : normal bowel sounds [Abdomen Soft] : soft [Abdomen Tenderness] : non-tender [Normal Vaginal Cuff] : vaginal cuff without lesion or nodularity [Normal] : normal spine exam without palpable tenderness, no kyphosis or scoliosis [de-identified] : well healed surgical scars  [de-identified] : no masses

## 2019-05-10 NOTE — HISTORY OF PRESENT ILLNESS
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 \par \par In 12/2018 she noted light vaginal bleeding with mild cramping. Transvaginal ultrasound on 02/11/19 showed moderate amount of fluid within the endometrial cavity, PAP on 2/2019 was negative.  2/2019 endometrial biopsy showed moderately differentiated adenocarcinoma. P16 positive, ER, OK negative. \par \par She was referred to Dr. Aguilar and underwent an MRI Pelvis on 3/26/19 which showed small amount of fluid within the endometrial cavity. No endometrial thickening is evident; recently diagnosed cancer is not visible on MR. No pelvic adenopathy. On 4/23/19 Dr. Aguilar perfomed a TRH, BSO, SLND for presumed endometrial cancer.  Pathology showed a 2.2 cm grade 1 adenocarcinoma of the endocervix involving the entire circumference of the cervix and infiltrates to a depth of 0.7 cm out of 0.8 cm wall thickness. Negative parametria, ovaries, tubes.  Vulva biopsy 6 o'clock showed lichen sclerosus. 0/4 negative lymph nodes (2 negative left external iliac nodes, 1 negative right external iliac node, 1 negative para-aortic node). Radial margin < 1 mm.  [id number]  FIGO IB2. Her case was presented at tumor board and the recommendation was for adjuvant radiation treatment.\par \par Today, she feels well, denies vaginal bleeding, pain, itch, discharge, edema, bowel, urine, pain, fatigue & weight loss. She lives alone but performs her ADLs.

## 2019-05-10 NOTE — REVIEW OF SYSTEMS
[Negative] : Allergic/Immunologic [Constipation: Grade 0] : Constipation: Grade 0 [Diarrhea: Grade 0] : Diarrhea: Grade 0 [Edema Limbs: Grade 0] : Edema Limbs: Grade 0  [Fatigue: Grade 0] : Fatigue: Grade 0 [Localized Edema: Grade 1 - Localized to dependent areas, no disability or functional impairment] : Localized Edema: Grade 1 - Localized to dependent areas, no disability or functional impairment [Neck Edema: Grade 0] : Neck Edema: Grade 0 [Hematuria: Grade 0] : Hematuria: Grade 0 [Urinary Incontinence: Grade 0] : Urinary Incontinence: Grade 0  [Urinary Retention: Grade 0] : Urinary Retention: Grade 0 [Urinary Tract Pain: Grade 0] : Urinary Tract Pain: Grade 0 [Urinary Urgency: Grade 0] : Urinary Urgency: Grade 0 [Urinary Frequency: Grade 0] : Urinary Frequency: Grade 0 [FreeTextEntry4] : pelvis

## 2019-05-10 NOTE — DISEASE MANAGEMENT
[Pathological] : TNM Stage: p [IB] : IB [FreeTextEntry4] : adenocarcinoma endocervix [TTNM] : 1b1 [NTNM] : 0 [MTNM] : 0

## 2019-05-10 NOTE — OB/GYN HISTORY
[History of Birth Control Pills] : Patient has a history of taking birth control pills [Currently In Menopause] : currently in menopause [Menopause Age: ____] : patient was [unfilled] years old at menopause [___] : Living: [unfilled] [History of Hormone Replacement Therapy] : no history of hormone replacement therapy [Experiencing Menopausal Sxs] : not experiencing menopausal symptoms

## 2019-05-10 NOTE — LETTER CLOSING
[Consult Closing:] : Thank you for allowing me to participate in the care of this patient.  If you have any questions, please do not hesitate to contact me. [Sincerely yours,] : Sincerely yours, [FreeTextEntry3] : Jagdish Cleaning MD\par Attending Physician\par Department of Radiation Medicine\par [facility] [place], [facility]\par \par \par [employer] \par [employer] [facility]\par

## 2019-05-10 NOTE — VITALS
[Maximal Pain Intensity: 0/10] : 0/10 [Least Pain Intensity: 0/10] : 0/10 [Date: ____________] : Patient's last distress assessment performed on [unfilled]. [Patient Refusal] : Patient refused psychosocial distress assessment [80: Normal activity with effort; some signs or symptoms of disease.] : 80: Normal activity with effort; some signs or symptoms of disease.  [ECOG Performance Status: 1 - Restricted in physically strenuous activity but ambulatory and able to carry out work of a light or sedentary nature] : Performance Status: 1 - Restricted in physically strenuous activity but ambulatory and able to carry out work of a light or sedentary nature, e.g., light house work, office work

## 2019-05-21 ENCOUNTER — OUTPATIENT (OUTPATIENT)
Dept: OUTPATIENT SERVICES | Facility: HOSPITAL | Age: 69
LOS: 1 days | Discharge: HOME | End: 2019-05-21
Payer: MEDICARE

## 2019-05-21 DIAGNOSIS — Z96.651 PRESENCE OF RIGHT ARTIFICIAL KNEE JOINT: Chronic | ICD-10-CM

## 2019-05-21 DIAGNOSIS — Z98.51 TUBAL LIGATION STATUS: Chronic | ICD-10-CM

## 2019-05-21 DIAGNOSIS — C54.1 MALIGNANT NEOPLASM OF ENDOMETRIUM: ICD-10-CM

## 2019-05-21 DIAGNOSIS — Z90.89 ACQUIRED ABSENCE OF OTHER ORGANS: Chronic | ICD-10-CM

## 2019-05-22 ENCOUNTER — APPOINTMENT (OUTPATIENT)
Dept: HEMATOLOGY ONCOLOGY | Facility: CLINIC | Age: 69
End: 2019-05-22
Payer: MEDICARE

## 2019-05-22 ENCOUNTER — RESULT REVIEW (OUTPATIENT)
Age: 69
End: 2019-05-22

## 2019-05-22 VITALS
HEIGHT: 67 IN | WEIGHT: 172 LBS | HEART RATE: 55 BPM | BODY MASS INDEX: 27 KG/M2 | SYSTOLIC BLOOD PRESSURE: 128 MMHG | OXYGEN SATURATION: 96 % | TEMPERATURE: 98.1 F | DIASTOLIC BLOOD PRESSURE: 83 MMHG

## 2019-05-22 LAB
BASOPHILS # BLD AUTO: 0.1 K/UL — SIGNIFICANT CHANGE UP (ref 0–0.2)
BASOPHILS NFR BLD AUTO: 1.5 % — SIGNIFICANT CHANGE UP (ref 0–2)
EOSINOPHIL # BLD AUTO: 0.3 K/UL — SIGNIFICANT CHANGE UP (ref 0–0.5)
EOSINOPHIL NFR BLD AUTO: 6.7 % — HIGH (ref 0–6)
HCT VFR BLD CALC: 38.4 % — SIGNIFICANT CHANGE UP (ref 34.5–45)
HGB BLD-MCNC: 12.7 G/DL — SIGNIFICANT CHANGE UP (ref 11.5–15.5)
LYMPHOCYTES # BLD AUTO: 1.5 K/UL — SIGNIFICANT CHANGE UP (ref 1–3.3)
LYMPHOCYTES # BLD AUTO: 34.9 % — SIGNIFICANT CHANGE UP (ref 13–44)
MCHC RBC-ENTMCNC: 32.4 PG — SIGNIFICANT CHANGE UP (ref 27–34)
MCHC RBC-ENTMCNC: 33 G/DL — SIGNIFICANT CHANGE UP (ref 32–36)
MCV RBC AUTO: 98.4 FL — SIGNIFICANT CHANGE UP (ref 80–100)
MONOCYTES # BLD AUTO: 0.3 K/UL — SIGNIFICANT CHANGE UP (ref 0–0.9)
MONOCYTES NFR BLD AUTO: 8 % — SIGNIFICANT CHANGE UP (ref 2–14)
NEUTROPHILS # BLD AUTO: 2.1 K/UL — SIGNIFICANT CHANGE UP (ref 1.8–7.4)
NEUTROPHILS NFR BLD AUTO: 48.8 % — SIGNIFICANT CHANGE UP (ref 43–77)
PLATELET # BLD AUTO: 240 K/UL — SIGNIFICANT CHANGE UP (ref 150–400)
RBC # BLD: 3.91 M/UL — SIGNIFICANT CHANGE UP (ref 3.8–5.2)
RBC # FLD: 13 % — SIGNIFICANT CHANGE UP (ref 10.3–14.5)
WBC # BLD: 4.3 K/UL — SIGNIFICANT CHANGE UP (ref 3.8–10.5)
WBC # FLD AUTO: 4.3 K/UL — SIGNIFICANT CHANGE UP (ref 3.8–10.5)

## 2019-05-22 PROCEDURE — 77338 DESIGN MLC DEVICE FOR IMRT: CPT | Mod: 26

## 2019-05-22 PROCEDURE — 99205 OFFICE O/P NEW HI 60 MIN: CPT

## 2019-05-22 PROCEDURE — 77300 RADIATION THERAPY DOSE PLAN: CPT | Mod: 26

## 2019-05-22 PROCEDURE — 77301 RADIOTHERAPY DOSE PLAN IMRT: CPT | Mod: 26

## 2019-05-22 NOTE — ASSESSMENT
[Curative] : Goals of care discussed with patient: Curative [FreeTextEntry1] : For women with early-stage cervical cancer treated with a primary surgical approach, adjuvant therapy should be administered if final pathologic findings suggest they are at risk for disease recurrence based on surgical findings and disease stage.   Observation is appropriate for patients with stage IA2, IB, or IIA1 disease who have negative nodes, negative margins, negative parametria and no cervical risk factors after radical hysterectomy (Sedlis Criteria).  However, adjuvant treatment is indicated after radical hysterectomy when pathologic risk factors are present.  Pelvic EBRT it is recommended with or without concurrent platinum containing chemotherapy [category IIB for chemotherapy] for patients with stage IA2, IB, or IIA1 disease who have negative lymph nodes after surgery but have large primary tumors, deep stromal invasion, or lymphovascular invasion.  Potentially important risk factors for recurrence are not limited to Sedlis Criteria.  Additional risk factors include adenocarcinoma component and close or positive surgical margins.  \par \par Postoperative pelvic EBRT with concurrent platinum containing chemotherapy with or without vaginal brachytherapy is recommended for patients with positive pelvic nodes, positive surgical margin and/or positive parametrium.  These patients are considered to have high risk disease. Adjuvant concurrent chemoradiation significantly improved overall survival for patients with high risk early stage disease who undergo radical hysterectomy and pelvic lymphadenectomy. Tthe intergroup trial 0107/ showed a statistically significant benefit of adjuvant pelvic radiation with concurrent cisplatin and 5-FU in the treatment of patients with high-risk features above.  We discussed her intermediate risk features today including simple hysterectomy and close but negative margin.  She is electing to proceed with adjuvant RT at this time and I have discussed this with both Dr. Cleaning and Dr. Aguilar.\par \par

## 2019-05-22 NOTE — RESULTS/DATA
[FreeTextEntry1] : 4/23/19 Pathology:\par Vulva @ 6oclock, biopsy: Lichen sclerosus\par Left external iliac sentinel lymph node #1, biopsy: One (1) lymph node, no tumor identified (H T E, Pan-CK)\par Left external iliac sentinel lymph node #2, biopsy: One (1) lymph node, no tumor identified (H T E, Pan-CK)\par Right external iliac lymph node, biopsy: One (1) lymph node, no tumor identified (H T E, Pan-CK)\par Para-aortic sentinel lymph node, biopsy: One (1) lymph node, no tumor identified (H T E, Pan-CK)\par Uterus, cervix, ovaries and fallopian tubes, hysterectomy and bilateral salpingoophorectomy: Adenocarcinoma, endocervix. Tumor size 2.2 cm x 1.8 cm x 0.7 cm.  Tumor involves the entire circumference of the cervix and infiltrates to a depth of 0.7 cm (less than 0.1 cm from the deep margin).  Cystic atrophy, endometrium.  Adenomyosis.  Right and left parametrial tissue, no tumor identified.  Ovaries and fallopian tubes, no tumor identified\par Synoptic Summary (UTERINE CERVIX Resection, June 2017)\par Procedure:   Simple hysterectomy, Bilateral salpingo-oophorectomy\par Tumor Size:  Greatest dimension (cm): 2.2 cm (22 mm)\par Tumor Site:  Entire circumference of cervix\par Histologic Type:  Endocervical adenocarcinoma\par Histologic Grade: Well differentiated\par Stromal Invasion: Yes\par Depth of Stromal Invasion (mm): 7 mm\par Horizontal extent longitudinal/length of stromal invasion (mm):  18 mm\par Horizontal extent circumferential/width of stromal invasion (mm): 22 mm\par Wall thickness in the area of deepest invasion: 8 mm\par Other Tissue/Organ Involvement:  Not identified\par Margins: Ectocervical Margin:  Uninvolved by invasive carcinoma, Distance of invasive carcinoma from margin: 7 mm\par Radial (Circumferencial) Margin: Uninvolved by invasive carcinoma, Distance of invasive carcinoma from margin: <1 mm\par LymphoVascular Invasion:  Indeterminate\par Regional Lymph Node: Total Number of Nodes Examined (sentinel and non-sentinel): 4.  Number of Lehr Nodes Examined: 3\par Pathologic Stage Classification (pTNM, AJCC 8th Edition)\par Primary Tumor (Invasive Carcinoma) (pT):  pT1b1\par Regional Lymph Nodes  pN (sn):  pN0\par Distant Metastasis (pM):  pMX\par \par 3/23/19 MRI Pelvis:\par Small amount of fluid within the endometrial cavity. No endometrial thickening is evident; recently diagnosed cancer is not visible on MR.  No pelvic adenopathy.\par \par 3/5/19 Pathology:\par Endometrial biopsy - moderately differentiated adenocarcinoma.\par Immunohistochemical stains were performed and results as follows:\par p16 - positive\par Vimentin - negative\par ER - negative\par NM - negative\par These findings favor an endocervical primary over an endometrial primary.\par \par 2/9/19 U/S Pelvis:\par Moderate amount of fluid within the endometrial cavity may be seen in the setting of cervical stenosis. Ovaries not visualized.

## 2019-05-22 NOTE — HISTORY OF PRESENT ILLNESS
[T: ___] : T[unfilled] [N: ___] : N[unfilled] [M: ___] : M[unfilled] [AJCC Stage: ____] : AJCC Stage: [unfilled] [de-identified] : The patient was diagnosed with endocervical adenocarcinoma in March 2019 at the age of 68.  She c/o light vaginal bleeding and associated mild cramping in December 2018.  She saw Gyn, Dr. Gregor Campuzano, who first ordered a Pelvic U/S which showed a moderate amount of fluid within the endometrial cavity, may be seen in the setting of cervical stenosis. Ovaries not visualized. Next an endometrial biopsy was done which was c/w moderately differentiated adenocarcinoma, P16 positive and Vimentin, ER, GA negative. An MRI pelvis showed a small amount of fluid within the endometrial cavity. No endometrial thickening is evident; recently diagnosed cancer is not visible on MR.  No pelvic adenopathy. She next saw Gyn surgery, Dr. Ellen Aguilar, who performed a total robotic hysterectomy and bilateral salpingo-oophorectomy.  Pathology c/w adenocarcinoma, endocervix. Tumor size 2.2 cm x 1.8 cm x 0.7 cm.  Tumor involves the entire circumference of the cervix and infiltrates to a depth of 0.7 cm (less than 0.1 cm from the deep margin), T1b1 [de-identified] : Patient presents for initial evaluation, s/p TRH, BSO, SLND on 04/03/19. She reports doing very well overall. She is still doing Lovenox injections. She has a dentist appointment for extraction on the 05/25 but will be done with injections by 05/22. She reports no vaginal bleeding, no pain, no nausea/vomiting. \par We had a lengthy discussion today and patient agrees only to continue with radiation therapy alone.\par

## 2019-05-22 NOTE — ADDENDUM
[FreeTextEntry1] : Documented by Go Chisholm acting as a scribe for Dr. Bri Shaikh on 05/22/2019.\par

## 2019-05-23 ENCOUNTER — RESULT REVIEW (OUTPATIENT)
Age: 69
End: 2019-05-23

## 2019-05-23 LAB
ALBUMIN SERPL ELPH-MCNC: 4 G/DL
ALP BLD-CCNC: 56 U/L
ALT SERPL-CCNC: 17 U/L
ANION GAP SERPL CALC-SCNC: 11 MMOL/L
AST SERPL-CCNC: 14 U/L
BILIRUB SERPL-MCNC: 0.4 MG/DL
BUN SERPL-MCNC: 13 MG/DL
CALCIUM SERPL-MCNC: 9.6 MG/DL
CANCER AG125 SERPL-ACNC: 17 U/ML
CHLORIDE SERPL-SCNC: 105 MMOL/L
CO2 SERPL-SCNC: 28 MMOL/L
CREAT SERPL-MCNC: 0.83 MG/DL
GLUCOSE SERPL-MCNC: 99 MG/DL
HBV CORE IGG+IGM SER QL: NONREACTIVE
HBV SURFACE AB SER QL: NONREACTIVE
HBV SURFACE AG SER QL: NONREACTIVE
HCV AB SER QL: NONREACTIVE
HCV S/CO RATIO: 0.08 S/CO
INR PPP: 1.04 RATIO
MAGNESIUM SERPL-MCNC: 1.8 MG/DL
POTASSIUM SERPL-SCNC: 5 MMOL/L
PROT SERPL-MCNC: 6.4 G/DL
PT BLD: 12 SEC
SODIUM SERPL-SCNC: 144 MMOL/L

## 2019-05-23 PROCEDURE — 88321 CONSLTJ&REPRT SLD PREP ELSWR: CPT | Mod: 26

## 2019-06-04 PROCEDURE — 77427 RADIATION TX MANAGEMENT X5: CPT

## 2019-06-04 PROCEDURE — 77387B: CUSTOM | Mod: 26

## 2019-06-05 ENCOUNTER — APPOINTMENT (OUTPATIENT)
Dept: GYNECOLOGIC ONCOLOGY | Facility: CLINIC | Age: 69
End: 2019-06-05
Payer: COMMERCIAL

## 2019-06-05 PROCEDURE — 77387B: CUSTOM | Mod: 26

## 2019-06-05 PROCEDURE — 99024 POSTOP FOLLOW-UP VISIT: CPT

## 2019-06-05 NOTE — DISCUSSION/SUMMARY
[Clean] : was clean [Dry] : was dry [Intact] : was intact [Erythema] : was not erythematous [None] : had no drainage [Ecchymosis] : was not ecchymotic [Seroma] : had no seroma [Normal Skin] : normal appearance [Firm] : soft [Tender] : nontender [Abnormal Bowel Sounds] : normal bowel sounds [Rebound] : no rebound tenderness [Guarding] : no guarding [Incisional Hernia] : no incisional hernia [Mass] : no palpable mass [External Genitalia Abnormal] : normal external genitalia [Doing Well] : is doing well [Vaginal Exam Abnormal] : normal vaginal exam [Excellent Pain Control] : has excellent pain control [No Sign of Infection] : is showing no signs of infection [0] : 0 [de-identified] : + well healed vaginal cuff

## 2019-06-05 NOTE — END OF VISIT
[FreeTextEntry3] : Continue radiation treatment with Dr. Claening \par \par Follow up after completion of radiation

## 2019-06-05 NOTE — REASON FOR VISIT
[Post Op] : post op visit [de-identified] : Pt presents for second post-op follow up. She reports doing very well overall. No concerns and she has had 2 treatments with radiation and reports doing very well. No vaginal bleeding, no fevers/chills, no nausea/vomiting. [de-identified] : 4/23/2019 [de-identified] : TRH, BSO, SLND

## 2019-06-06 ENCOUNTER — APPOINTMENT (OUTPATIENT)
Dept: DERMATOLOGY | Facility: CLINIC | Age: 69
End: 2019-06-06
Payer: MEDICARE

## 2019-06-06 VITALS
TEMPERATURE: 98.1 F | RESPIRATION RATE: 16 BRPM | SYSTOLIC BLOOD PRESSURE: 123 MMHG | HEART RATE: 88 BPM | BODY MASS INDEX: 27 KG/M2 | HEIGHT: 67 IN | WEIGHT: 172 LBS | DIASTOLIC BLOOD PRESSURE: 73 MMHG | OXYGEN SATURATION: 96 %

## 2019-06-06 PROCEDURE — 99203 OFFICE O/P NEW LOW 30 MIN: CPT

## 2019-06-06 PROCEDURE — 77387B: CUSTOM | Mod: 26

## 2019-06-06 NOTE — REVIEW OF SYSTEMS
[Constipation: Grade 0] : Constipation: Grade 0 [Diarrhea: Grade 0] : Diarrhea: Grade 0 [Edema Limbs: Grade 0] : Edema Limbs: Grade 0  [Fatigue: Grade 0] : Fatigue: Grade 0 [Localized Edema: Grade 0] : Localized Edema: Grade 0  [Neck Edema: Grade 0] : Neck Edema: Grade 0 [Urinary Incontinence: Grade 0] : Urinary Incontinence: Grade 0  [Skin Hyperpigmentation: Grade 0] : Skin Hyperpigmentation: Grade 0

## 2019-06-06 NOTE — DISEASE MANAGEMENT
[Pathological] : TNM Stage: p [IB] : IB [TTNM] : 1b [FreeTextEntry4] : adenocarcinoma endocervix [MTNM] : 0 [NTNM] : 0 [de-identified] : pelvis [de-identified] : 2519 [de-identified] : 982

## 2019-06-07 PROCEDURE — 77387B: CUSTOM | Mod: 26

## 2019-06-10 PROCEDURE — 77014: CPT | Mod: 26

## 2019-06-11 PROCEDURE — 77387B: CUSTOM | Mod: 26

## 2019-06-11 PROCEDURE — 77427 RADIATION TX MANAGEMENT X5: CPT

## 2019-06-12 PROCEDURE — 77387B: CUSTOM | Mod: 26

## 2019-06-13 VITALS
TEMPERATURE: 97.5 F | RESPIRATION RATE: 16 BRPM | OXYGEN SATURATION: 98 % | HEIGHT: 67 IN | BODY MASS INDEX: 27.31 KG/M2 | HEART RATE: 68 BPM | DIASTOLIC BLOOD PRESSURE: 75 MMHG | WEIGHT: 174 LBS | SYSTOLIC BLOOD PRESSURE: 116 MMHG

## 2019-06-13 PROCEDURE — 77387B: CUSTOM | Mod: 26

## 2019-06-13 NOTE — REVIEW OF SYSTEMS
[Constipation: Grade 0] : Constipation: Grade 0 [Edema Limbs: Grade 0] : Edema Limbs: Grade 0  [Neck Edema: Grade 0] : Neck Edema: Grade 0 [Localized Edema: Grade 0] : Localized Edema: Grade 0  [Urinary Incontinence: Grade 0] : Urinary Incontinence: Grade 0  [Skin Hyperpigmentation: Grade 0] : Skin Hyperpigmentation: Grade 0 [Fatigue: Grade 1 - Fatigue relieved by rest] : Fatigue: Grade 1 - Fatigue relieved by rest [Urinary Tract Pain: Grade 0] : Urinary Tract Pain: Grade 0 [Diarrhea: Grade 0] : Diarrhea: Grade 0 [FreeTextEntry3] : loose bm, 1x/day [Rectal Pain: Grade 0] : Rectal Pain: Grade 0 [Proctitis: Grade 0] : Proctitis: Grade 0 [Pruritus: Grade 0] : Pruritus: Grade 0 [Skin Induration: Grade 0] : Skin Induration: Grade 0 [Dermatitis Radiation: Grade 0] : Dermatitis Radiation: Grade 0 [Skin Atrophy: Grade 0] : Skin Atrophy: Grade 0

## 2019-06-13 NOTE — VITALS
[Maximal Pain Intensity: 0/10] : 0/10 [90: Able to carry normal activity; minor signs or symptoms of disease.] : 90: Able to carry normal activity; minor signs or symptoms of disease.  [Least Pain Intensity: 0/10] : 0/10

## 2019-06-14 PROCEDURE — 77387B: CUSTOM | Mod: 26

## 2019-06-17 ENCOUNTER — RX RENEWAL (OUTPATIENT)
Age: 69
End: 2019-06-17

## 2019-06-17 PROCEDURE — 77014: CPT | Mod: 26

## 2019-06-18 PROCEDURE — 77387B: CUSTOM | Mod: 26

## 2019-06-18 PROCEDURE — 77427 RADIATION TX MANAGEMENT X5: CPT

## 2019-06-19 PROCEDURE — 77387B: CUSTOM | Mod: 26

## 2019-06-20 VITALS
TEMPERATURE: 97.5 F | HEIGHT: 67 IN | HEART RATE: 71 BPM | RESPIRATION RATE: 16 BRPM | OXYGEN SATURATION: 98 % | BODY MASS INDEX: 27.31 KG/M2 | DIASTOLIC BLOOD PRESSURE: 75 MMHG | SYSTOLIC BLOOD PRESSURE: 129 MMHG | WEIGHT: 174 LBS

## 2019-06-20 PROCEDURE — 77387B: CUSTOM | Mod: 26

## 2019-06-20 NOTE — REVIEW OF SYSTEMS
[Diarrhea: Grade 1 - Increase of <4 stools per day over baseline; mild increase in ostomy output compared to baseline] : Diarrhea: Grade 1 - Increase of <4 stools per day over baseline; mild increase in ostomy output compared to baseline [Fatigue: Grade 1 - Fatigue relieved by rest] : Fatigue: Grade 1 - Fatigue relieved by rest [Dermatitis Radiation: Grade 0] : Dermatitis Radiation: Grade 0

## 2019-06-20 NOTE — HISTORY OF PRESENT ILLNESS
[FreeTextEntry1] : pt had some loose bowel movements which pt took 1 dose of Imodium with relief.  No complaints of pain.  Urinating well.  No vaginal bleeding or discharge.

## 2019-06-21 PROCEDURE — 77387B: CUSTOM | Mod: 26

## 2019-06-24 PROCEDURE — 77014: CPT | Mod: 26

## 2019-06-25 PROCEDURE — 77387B: CUSTOM | Mod: 26

## 2019-06-25 PROCEDURE — 77427 RADIATION TX MANAGEMENT X5: CPT

## 2019-06-26 PROCEDURE — 77387B: CUSTOM | Mod: 26

## 2019-06-27 VITALS
HEART RATE: 60 BPM | OXYGEN SATURATION: 96 % | DIASTOLIC BLOOD PRESSURE: 77 MMHG | BODY MASS INDEX: 27.15 KG/M2 | RESPIRATION RATE: 16 BRPM | TEMPERATURE: 97.6 F | HEIGHT: 67 IN | WEIGHT: 173 LBS | SYSTOLIC BLOOD PRESSURE: 126 MMHG

## 2019-06-27 LAB — 25(OH)D3 SERPL-MCNC: 70.4 NG/ML

## 2019-06-27 PROCEDURE — 77387B: CUSTOM | Mod: 26

## 2019-06-27 NOTE — DISEASE MANAGEMENT
[Pathological] : TNM Stage: p [IB] : IB [FreeTextEntry4] : adenocarcinoma endocervix [TTNM] : 1b [MTNM] : 0 [NTNM] : 0 [de-identified] : 5461 [de-identified] : 7479 [de-identified] : pelvis

## 2019-06-27 NOTE — PHYSICAL EXAM
[Normal] : oriented to person, place and time, the affect was normal, the mood was normal and not anxious [de-identified] : raised rash left buttock & upper anal verge dry

## 2019-06-27 NOTE — REVIEW OF SYSTEMS
[Constipation: Grade 0] : Constipation: Grade 0 [Proctitis: Grade 0] : Proctitis: Grade 0 [Rectal Pain: Grade 0] : Rectal Pain: Grade 0 [Edema Limbs: Grade 0] : Edema Limbs: Grade 0  [Fatigue: Grade 1 - Fatigue relieved by rest] : Fatigue: Grade 1 - Fatigue relieved by rest [Localized Edema: Grade 0] : Localized Edema: Grade 0  [Neck Edema: Grade 0] : Neck Edema: Grade 0 [Urinary Incontinence: Grade 0] : Urinary Incontinence: Grade 0  [Urinary Tract Pain: Grade 0] : Urinary Tract Pain: Grade 0 [Pruritus: Grade 0] : Pruritus: Grade 0 [Skin Atrophy: Grade 0] : Skin Atrophy: Grade 0 [Skin Hyperpigmentation: Grade 0] : Skin Hyperpigmentation: Grade 0 [Skin Induration: Grade 0] : Skin Induration: Grade 0 [Dermatitis Radiation: Grade 0] : Dermatitis Radiation: Grade 0 [Diarrhea: Grade 1 - Increase of <4 stools per day over baseline; mild increase in ostomy output compared to baseline] : Diarrhea: Grade 1 - Increase of <4 stools per day over baseline; mild increase in ostomy output compared to baseline [FreeTextEntry3] : loose bm, 1x/day

## 2019-06-28 PROCEDURE — 77387B: CUSTOM | Mod: 26

## 2019-07-01 PROCEDURE — 77014: CPT | Mod: 26

## 2019-07-02 LAB
BASOPHILS # BLD AUTO: 0.02 K/UL
BASOPHILS NFR BLD AUTO: 0.6 %
EOSINOPHIL # BLD AUTO: 0.39 K/UL
EOSINOPHIL NFR BLD AUTO: 11.9 %
HCT VFR BLD CALC: 35.7 %
HGB BLD-MCNC: 11.8 G/DL
IMM GRANULOCYTES NFR BLD AUTO: 0.3 %
LYMPHOCYTES # BLD AUTO: 0.71 K/UL
LYMPHOCYTES NFR BLD AUTO: 21.7 %
MAN DIFF?: NORMAL
MCHC RBC-ENTMCNC: 33 PG
MCHC RBC-ENTMCNC: 33.1 GM/DL
MCV RBC AUTO: 99.7 FL
MONOCYTES # BLD AUTO: 0.33 K/UL
MONOCYTES NFR BLD AUTO: 10.1 %
NEUTROPHILS # BLD AUTO: 1.81 K/UL
NEUTROPHILS NFR BLD AUTO: 55.4 %
PLATELET # BLD AUTO: 179 K/UL
RBC # BLD: 3.58 M/UL
RBC # FLD: 13.6 %
WBC # FLD AUTO: 3.27 K/UL

## 2019-07-02 PROCEDURE — 77427 RADIATION TX MANAGEMENT X5: CPT

## 2019-07-02 PROCEDURE — 77387B: CUSTOM | Mod: 26

## 2019-07-03 ENCOUNTER — RESULT CHARGE (OUTPATIENT)
Age: 69
End: 2019-07-03

## 2019-07-03 PROCEDURE — 77387B: CUSTOM | Mod: 26

## 2019-07-05 VITALS
RESPIRATION RATE: 16 BRPM | TEMPERATURE: 98 F | DIASTOLIC BLOOD PRESSURE: 71 MMHG | OXYGEN SATURATION: 98 % | SYSTOLIC BLOOD PRESSURE: 118 MMHG | HEART RATE: 72 BPM

## 2019-07-05 PROCEDURE — 77387B: CUSTOM | Mod: 26

## 2019-07-05 NOTE — HISTORY OF PRESENT ILLNESS
[FreeTextEntry1] : Maggie Price is a 68 year old woman with AJCC pT1b1 N0 M0, FIGO 2018 IB2 adenocarcinoma of the endocervix. She is s/p TLH, BSO, SLND. She had a close radial margin < 1 mm, 7 out 8 mm stromal invasion and indeterminate LVSI. She had 4 negative nodes. \par \par She presents here today for radiation treatment. She denies any gastrointestinal side effect and pain. she completed 4140/5040 cGy today

## 2019-07-05 NOTE — REVIEW OF SYSTEMS
[Diarrhea: Grade 0] : Diarrhea: Grade 0 [Constipation: Grade 0] : Constipation: Grade 0 [Proctitis: Grade 0] : Proctitis: Grade 0 [Rectal Pain: Grade 0] : Rectal Pain: Grade 0 [Edema Limbs: Grade 0] : Edema Limbs: Grade 0  [Fatigue: Grade 1 - Fatigue relieved by rest] : Fatigue: Grade 1 - Fatigue relieved by rest [Neck Edema: Grade 0] : Neck Edema: Grade 0 [Localized Edema: Grade 0] : Localized Edema: Grade 0  [Urinary Tract Pain: Grade 0] : Urinary Tract Pain: Grade 0 [Urinary Incontinence: Grade 0] : Urinary Incontinence: Grade 0  [Pruritus: Grade 0] : Pruritus: Grade 0 [Skin Atrophy: Grade 0] : Skin Atrophy: Grade 0 [Skin Hyperpigmentation: Grade 0] : Skin Hyperpigmentation: Grade 0 [Skin Induration: Grade 0] : Skin Induration: Grade 0 [de-identified] : 1 episode [Nausea: Grade 1 - Loss of appetite without alteration in eating habits] : Nausea: Grade 1 - Loss of appetite without alteration in eating habits [Dermatitis Radiation: Grade 1 - Faint erythema or dry desquamation] : Dermatitis Radiation: Grade 1 - Faint erythema or dry desquamation

## 2019-07-05 NOTE — PHYSICAL EXAM
[Normal] : normoactive bowel sounds, soft and nontender, no hepatosplenomegaly or masses appreciated [de-identified] : rash left buttock

## 2019-07-05 NOTE — DISEASE MANAGEMENT
[Pathological] : TNM Stage: p [IB] : IB [TTNM] : 1b [FreeTextEntry4] : adenocarcinoma endocervix [NTNM] : 0 [MTNM] : 0 [de-identified] : 6672 [de-identified] : 1628 [de-identified] : pelvis

## 2019-07-08 PROCEDURE — 77014: CPT | Mod: 26

## 2019-07-09 PROCEDURE — 77387B: CUSTOM | Mod: 26

## 2019-07-10 PROCEDURE — 77427 RADIATION TX MANAGEMENT X5: CPT

## 2019-07-10 PROCEDURE — 77387B: CUSTOM | Mod: 26

## 2019-07-18 VITALS
DIASTOLIC BLOOD PRESSURE: 76 MMHG | SYSTOLIC BLOOD PRESSURE: 128 MMHG | RESPIRATION RATE: 16 BRPM | TEMPERATURE: 97.7 F | BODY MASS INDEX: 27.47 KG/M2 | OXYGEN SATURATION: 99 % | HEART RATE: 62 BPM | WEIGHT: 175 LBS | HEIGHT: 67 IN

## 2019-07-18 PROCEDURE — 77387B: CUSTOM | Mod: 26

## 2019-07-18 NOTE — DISEASE MANAGEMENT
[Pathological] : TNM Stage: p [IB] : IB [FreeTextEntry4] : adenocarcinoma endocervix [TTNM] : 1b [NTNM] : 0 [MTNM] : 0 [de-identified] : 4062 [de-identified] : 0564 [de-identified] : pelvis

## 2019-07-18 NOTE — PHYSICAL EXAM
[Normal] : oriented to person, place and time, the affect was normal, the mood was normal and not anxious [de-identified] : raised rash left buttock & upper anal verge dry

## 2019-07-18 NOTE — REVIEW OF SYSTEMS
[Constipation: Grade 0] : Constipation: Grade 0 [Proctitis: Grade 0] : Proctitis: Grade 0 [Rectal Pain: Grade 0] : Rectal Pain: Grade 0 [Edema Limbs: Grade 0] : Edema Limbs: Grade 0  [Localized Edema: Grade 0] : Localized Edema: Grade 0  [Neck Edema: Grade 0] : Neck Edema: Grade 0 [Urinary Incontinence: Grade 0] : Urinary Incontinence: Grade 0  [Urinary Tract Pain: Grade 0] : Urinary Tract Pain: Grade 0 [Pruritus: Grade 0] : Pruritus: Grade 0 [Skin Atrophy: Grade 0] : Skin Atrophy: Grade 0 [Skin Hyperpigmentation: Grade 0] : Skin Hyperpigmentation: Grade 0 [Skin Induration: Grade 0] : Skin Induration: Grade 0 [Dermatitis Radiation: Grade 0] : Dermatitis Radiation: Grade 0 [Diarrhea: Grade 0] : Diarrhea: Grade 0 [Fatigue: Grade 0] : Fatigue: Grade 0

## 2019-07-19 PROCEDURE — 77387B: CUSTOM | Mod: 26

## 2019-07-31 NOTE — DISCUSSION/SUMMARY
[Follow up with Surgeon in _____] : Follow up with Surgeon in [unfilled] [Primary care physician] : primary care physician [Fatigue] : Fatigue [Sexual Function] : sexual function [Sexual Functioning] : Sexual functioning [Bridge to Survivorship GYN Cancer] : Bridge to Survivorship G[facility] Cancer

## 2019-08-08 ENCOUNTER — APPOINTMENT (OUTPATIENT)
Dept: DERMATOLOGY | Facility: CLINIC | Age: 69
End: 2019-08-08
Payer: MEDICARE

## 2019-08-08 PROCEDURE — 99213 OFFICE O/P EST LOW 20 MIN: CPT

## 2019-08-16 ENCOUNTER — APPOINTMENT (OUTPATIENT)
Dept: RADIATION ONCOLOGY | Facility: CLINIC | Age: 69
End: 2019-08-16
Payer: MEDICARE

## 2019-08-16 VITALS
TEMPERATURE: 97.8 F | BODY MASS INDEX: 27.15 KG/M2 | DIASTOLIC BLOOD PRESSURE: 73 MMHG | HEART RATE: 60 BPM | RESPIRATION RATE: 16 BRPM | SYSTOLIC BLOOD PRESSURE: 108 MMHG | HEIGHT: 67 IN | WEIGHT: 173 LBS | OXYGEN SATURATION: 97 %

## 2019-08-16 PROCEDURE — 99024 POSTOP FOLLOW-UP VISIT: CPT

## 2019-08-16 NOTE — REVIEW OF SYSTEMS
[Negative] : Allergic/Immunologic [Constipation: Grade 0] : Constipation: Grade 0 [Diarrhea: Grade 0] : Diarrhea: Grade 0 [Edema Limbs: Grade 0] : Edema Limbs: Grade 0  [Fatigue: Grade 0] : Fatigue: Grade 0 [Neck Edema: Grade 0] : Neck Edema: Grade 0 [Localized Edema: Grade 0] : Localized Edema: Grade 0  [Urinary Incontinence: Grade 0] : Urinary Incontinence: Grade 0  [Dyspareunia: Grade 0] : Dyspareunia: Grade 0 [Skin Hyperpigmentation: Grade 0] : Skin Hyperpigmentation: Grade 0 [Nausea: Grade 0] : Nausea: Grade 0 [Vomiting: Grade 0] : Vomiting: Grade 0 [Urinary Retention: Grade 0] : Urinary Retention: Grade 0 [Urinary Tract Pain: Grade 0] : Urinary Tract Pain: Grade 0 [Urinary Urgency: Grade 0] : Urinary Urgency: Grade 0 [Vaginal Infection: Grade 0] : Vaginal Infection: Grade 0  [Skin Atrophy: Grade 0] : Skin Atrophy: Grade 0 [Pruritus: Grade 0] : Pruritus: Grade 0 [Skin Induration: Grade 0] : Skin Induration: Grade 0 [Dermatitis Radiation: Grade 1 - Faint erythema or dry desquamation] : Dermatitis Radiation: Grade 1 - Faint erythema or dry desquamation

## 2019-08-16 NOTE — DISCUSSION/SUMMARY
[Systemic Therapy (chemotherapy, hormonal therapy, other)] : Systemic Therapy (chemotherapy, hormonal therapy, other): No [Need for onging (adjuvant) treatment for cancer?] : Need for onging (adjuvant) treatment for cancer? No [Scans: ______] : Scans: [unfilled] [FreeTextEntry8] : Alix Bravo [FreeTextEntry9] : 8/16/19

## 2019-08-16 NOTE — PHYSICAL EXAM
[Normal] : normoactive bowel sounds, soft and nontender, no hepatosplenomegaly or masses appreciated [Extraocular Movements] : extraocular movements were intact

## 2019-08-16 NOTE — DISEASE MANAGEMENT
[Pathological] : TNM Stage: p [IB] : IB [FreeTextEntry4] : adenocarcinoma endocervix  [TTNM] : 1b [NTNM] : 0 [MTNM] : 0

## 2019-08-16 NOTE — HISTORY OF PRESENT ILLNESS
[FreeTextEntry1] : 68 year old female returns today s/p adjuvant 5,040cGy for a [id number] lB adenocarcinoma of the endocervix completed 7/19/19.\par \par Reports diarrhea improved within 2 weeks of completing treatment.  Denies vaginal itch, discharge, edema, change in bowel, change in urination, fatigue or weight loss.\par \par To follow with Dr. Shaikh 8/22/19 and to make an appointment with Dr. Aguilar.

## 2019-08-21 ENCOUNTER — OUTPATIENT (OUTPATIENT)
Dept: OUTPATIENT SERVICES | Facility: HOSPITAL | Age: 69
LOS: 1 days | Discharge: HOME | End: 2019-08-21

## 2019-08-21 DIAGNOSIS — C54.1 MALIGNANT NEOPLASM OF ENDOMETRIUM: ICD-10-CM

## 2019-08-21 DIAGNOSIS — Z90.89 ACQUIRED ABSENCE OF OTHER ORGANS: Chronic | ICD-10-CM

## 2019-08-21 DIAGNOSIS — Z98.51 TUBAL LIGATION STATUS: Chronic | ICD-10-CM

## 2019-08-21 DIAGNOSIS — Z96.651 PRESENCE OF RIGHT ARTIFICIAL KNEE JOINT: Chronic | ICD-10-CM

## 2019-08-22 ENCOUNTER — APPOINTMENT (OUTPATIENT)
Dept: HEMATOLOGY ONCOLOGY | Facility: CLINIC | Age: 69
End: 2019-08-22
Payer: MEDICARE

## 2019-08-22 ENCOUNTER — RECORD ABSTRACTING (OUTPATIENT)
Age: 69
End: 2019-08-22

## 2019-08-22 ENCOUNTER — RESULT REVIEW (OUTPATIENT)
Age: 69
End: 2019-08-22

## 2019-08-22 VITALS
WEIGHT: 172.13 LBS | HEIGHT: 67 IN | OXYGEN SATURATION: 98 % | TEMPERATURE: 98.1 F | HEART RATE: 66 BPM | DIASTOLIC BLOOD PRESSURE: 65 MMHG | SYSTOLIC BLOOD PRESSURE: 104 MMHG | BODY MASS INDEX: 27.02 KG/M2

## 2019-08-22 LAB
BASOPHILS # BLD AUTO: 0.1 K/UL — SIGNIFICANT CHANGE UP (ref 0–0.2)
BASOPHILS NFR BLD AUTO: 1.9 % — SIGNIFICANT CHANGE UP (ref 0–2)
EOSINOPHIL # BLD AUTO: 0.2 K/UL — SIGNIFICANT CHANGE UP (ref 0–0.5)
EOSINOPHIL NFR BLD AUTO: 4.5 % — SIGNIFICANT CHANGE UP (ref 0–6)
HCT VFR BLD CALC: 36.3 % — SIGNIFICANT CHANGE UP (ref 34.5–45)
HGB BLD-MCNC: 12.3 G/DL — SIGNIFICANT CHANGE UP (ref 11.5–15.5)
LYMPHOCYTES # BLD AUTO: 0.8 K/UL — LOW (ref 1–3.3)
LYMPHOCYTES # BLD AUTO: 22.1 % — SIGNIFICANT CHANGE UP (ref 13–44)
MCHC RBC-ENTMCNC: 33.2 PG — SIGNIFICANT CHANGE UP (ref 27–34)
MCHC RBC-ENTMCNC: 33.9 G/DL — SIGNIFICANT CHANGE UP (ref 32–36)
MCV RBC AUTO: 98.1 FL — SIGNIFICANT CHANGE UP (ref 80–100)
MONOCYTES # BLD AUTO: 0.3 K/UL — SIGNIFICANT CHANGE UP (ref 0–0.9)
MONOCYTES NFR BLD AUTO: 8.6 % — SIGNIFICANT CHANGE UP (ref 2–14)
NEUTROPHILS # BLD AUTO: 2.3 K/UL — SIGNIFICANT CHANGE UP (ref 1.8–7.4)
NEUTROPHILS NFR BLD AUTO: 62.9 % — SIGNIFICANT CHANGE UP (ref 43–77)
PLATELET # BLD AUTO: 232 K/UL — SIGNIFICANT CHANGE UP (ref 150–400)
RBC # BLD: 3.7 M/UL — LOW (ref 3.8–5.2)
RBC # FLD: 13.1 % — SIGNIFICANT CHANGE UP (ref 10.3–14.5)
WBC # BLD: 3.6 K/UL — LOW (ref 3.8–10.5)
WBC # FLD AUTO: 3.6 K/UL — LOW (ref 3.8–10.5)

## 2019-08-22 PROCEDURE — 99214 OFFICE O/P EST MOD 30 MIN: CPT

## 2019-08-22 NOTE — HISTORY OF PRESENT ILLNESS
[de-identified] : The patient was diagnosed with endocervical adenocarcinoma in March 2019 at the age of 68.  She c/o light vaginal bleeding and associated mild cramping in December 2018.  She saw Gyn, Dr. Gregor Campuzano, who first ordered a Pelvic U/S which showed a moderate amount of fluid within the endometrial cavity, may be seen in the setting of cervical stenosis. Ovaries not visualized. Next an endometrial biopsy was done which was c/w moderately differentiated adenocarcinoma, P16 positive and Vimentin, ER, GA negative. An MRI pelvis showed a small amount of fluid within the endometrial cavity. No endometrial thickening is evident; recently diagnosed cancer is not visible on MR.  No pelvic adenopathy. She next saw Gyn surgery, Dr. Ellen Aguilar, who performed a total robotic hysterectomy and bilateral salpingo-oophorectomy.  Pathology c/w adenocarcinoma, endocervix. Tumor size 2.2 cm x 1.8 cm x 0.7 cm.  Tumor involves the entire circumference of the cervix and infiltrates to a depth of 0.7 cm (less than 0.1 cm from the deep margin), T1b1 [de-identified] : Patient presents s/p TRH, BSO, SLND on 04/03/19. She reports doing very well overall. She reports no vaginal bleeding, no pain, no nausea/vomiting.  She is s/p adjuvant 5,040cGy for a [id number] lB adenocarcinoma of the endocervix completed 7/19/19.  Reports diarrhea improved within 2 weeks of completing treatment. Denies vaginal itch, discharge, edema, change in bowel, change in urination, fatigue or weight loss.\par \par

## 2019-08-22 NOTE — RESULTS/DATA
[FreeTextEntry1] : 4/23/19 Pathology:\par Vulva @ 6oclock, biopsy: Lichen sclerosus\par Left external iliac sentinel lymph node #1, biopsy: One (1) lymph node, no tumor identified (H T E, Pan-CK)\par Left external iliac sentinel lymph node #2, biopsy: One (1) lymph node, no tumor identified (H T E, Pan-CK)\par Right external iliac lymph node, biopsy: One (1) lymph node, no tumor identified (H T E, Pan-CK)\par Para-aortic sentinel lymph node, biopsy: One (1) lymph node, no tumor identified (H T E, Pan-CK)\par Uterus, cervix, ovaries and fallopian tubes, hysterectomy and bilateral salpingoophorectomy: Adenocarcinoma, endocervix. Tumor size 2.2 cm x 1.8 cm x 0.7 cm.  Tumor involves the entire circumference of the cervix and infiltrates to a depth of 0.7 cm (less than 0.1 cm from the deep margin).  Cystic atrophy, endometrium.  Adenomyosis.  Right and left parametrial tissue, no tumor identified.  Ovaries and fallopian tubes, no tumor identified\par Synoptic Summary (UTERINE CERVIX Resection, June 2017)\par Procedure:   Simple hysterectomy, Bilateral salpingo-oophorectomy\par Tumor Size:  Greatest dimension (cm): 2.2 cm (22 mm)\par Tumor Site:  Entire circumference of cervix\par Histologic Type:  Endocervical adenocarcinoma\par Histologic Grade: Well differentiated\par Stromal Invasion: Yes\par Depth of Stromal Invasion (mm): 7 mm\par Horizontal extent longitudinal/length of stromal invasion (mm):  18 mm\par Horizontal extent circumferential/width of stromal invasion (mm): 22 mm\par Wall thickness in the area of deepest invasion: 8 mm\par Other Tissue/Organ Involvement:  Not identified\par Margins: Ectocervical Margin:  Uninvolved by invasive carcinoma, Distance of invasive carcinoma from margin: 7 mm\par Radial (Circumferencial) Margin: Uninvolved by invasive carcinoma, Distance of invasive carcinoma from margin: <1 mm\par LymphoVascular Invasion:  Indeterminate\par Regional Lymph Node: Total Number of Nodes Examined (sentinel and non-sentinel): 4.  Number of [place] Nodes Examined: 3\par Pathologic Stage Classification (pTNM, AJCC 8th Edition)\par Primary Tumor (Invasive Carcinoma) (pT):  pT1b1\par Regional Lymph Nodes  pN (sn):  pN0\par Distant Metastasis (pM):  pMX\par \par 3/23/19 MRI Pelvis:\par Small amount of fluid within the endometrial cavity. No endometrial thickening is evident; recently diagnosed cancer is not visible on MR.  No pelvic adenopathy.\par \par 3/5/19 Pathology:\par Endometrial biopsy - moderately differentiated adenocarcinoma.\par Immunohistochemical stains were performed and results as follows:\par p16 - positive\par Vimentin - negative\par ER - negative\par CA - negative\par These findings favor an endocervical primary over an endometrial primary.\par \par 2/9/19 U/S Pelvis:\par Moderate amount of fluid within the endometrial cavity may be seen in the setting of cervical stenosis. Ovaries not visualized.

## 2019-08-22 NOTE — ASSESSMENT
[FreeTextEntry1] : For women with early-stage cervical cancer treated with a primary surgical approach, adjuvant therapy should be administered if final pathologic findings suggest they are at risk for disease recurrence based on surgical findings and disease stage.   Observation is appropriate for patients with stage IA2, IB, or IIA1 disease who have negative nodes, negative margins, negative parametria and no cervical risk factors after radical hysterectomy (Sedlis Criteria).  However, adjuvant treatment is indicated after radical hysterectomy when pathologic risk factors are present.  Pelvic EBRT it is recommended with or without concurrent platinum containing chemotherapy [category IIB for chemotherapy] for patients with stage IA2, IB, or IIA1 disease who have negative lymph nodes after surgery but have large primary tumors, deep stromal invasion, or lymphovascular invasion.  Potentially important risk factors for recurrence are not limited to Sedlis Criteria.  Additional risk factors include adenocarcinoma component and close or positive surgical margins.  \par \par Postoperative pelvic EBRT with concurrent platinum containing chemotherapy with or without vaginal brachytherapy is recommended for patients with positive pelvic nodes, positive surgical margin and/or positive parametrium.  These patients are considered to have high risk disease. Adjuvant concurrent chemoradiation significantly improved overall survival for patients with high risk early stage disease who undergo radical hysterectomy and pelvic lymphadenectomy. Tthe intergroup trial 0107/ showed a statistically significant benefit of adjuvant pelvic radiation with concurrent cisplatin and 5-FU in the treatment of patients with high-risk features above.  We discussed her intermediate risk features today including simple hysterectomy and close but negative margin.  She is electing to proceed with adjuvant RT at this time and I have discussed this with both Dr. Cleaning and Dr. Aguilar.\par \par

## 2019-08-23 ENCOUNTER — APPOINTMENT (OUTPATIENT)
Dept: RADIATION ONCOLOGY | Facility: CLINIC | Age: 69
End: 2019-08-23

## 2019-08-23 LAB
ALBUMIN SERPL ELPH-MCNC: 4.3 G/DL
ALP BLD-CCNC: 73 U/L
ALT SERPL-CCNC: 49 U/L
ANION GAP SERPL CALC-SCNC: 12 MMOL/L
AST SERPL-CCNC: 47 U/L
BILIRUB SERPL-MCNC: 0.5 MG/DL
BUN SERPL-MCNC: 19 MG/DL
CALCIUM SERPL-MCNC: 9.9 MG/DL
CHLORIDE SERPL-SCNC: 104 MMOL/L
CO2 SERPL-SCNC: 28 MMOL/L
CREAT SERPL-MCNC: 0.88 MG/DL
GLUCOSE SERPL-MCNC: 96 MG/DL
MAGNESIUM SERPL-MCNC: 2 MG/DL
POTASSIUM SERPL-SCNC: 5.2 MMOL/L
PROT SERPL-MCNC: 6.9 G/DL
SODIUM SERPL-SCNC: 144 MMOL/L

## 2019-08-28 ENCOUNTER — APPOINTMENT (OUTPATIENT)
Dept: GYNECOLOGIC ONCOLOGY | Facility: CLINIC | Age: 69
End: 2019-08-28
Payer: MEDICARE

## 2019-08-28 VITALS
TEMPERATURE: 97.9 F | WEIGHT: 173 LBS | SYSTOLIC BLOOD PRESSURE: 120 MMHG | DIASTOLIC BLOOD PRESSURE: 64 MMHG | HEIGHT: 67 IN | BODY MASS INDEX: 27.15 KG/M2

## 2019-08-28 PROCEDURE — 99214 OFFICE O/P EST MOD 30 MIN: CPT

## 2019-08-28 NOTE — PHYSICAL EXAM
[Absent] : Adnexa(ae): Absent [Normal] : Recto-Vaginal Exam: Normal [Restricted in physically strenuous activity but ambulatory and able to carry out work of a light or sedentary nature] : Status 1- Restricted in physically strenuous activity but ambulatory and able to carry out work of a light or sedentary nature, e.g., light house work, office work

## 2019-08-28 NOTE — DISCUSSION/SUMMARY
[FreeTextEntry1] : Discussed the todays exam is LELAND. Continue to alternate visits between me and Dr. Cleaning. She will get PET CT for baseline after treatment in october and follow up with Dr. Cleaning for results. I will see her back in 6 months for surveillance visit.

## 2019-08-28 NOTE — HISTORY OF PRESENT ILLNESS
[FreeTextEntry1] : Pt is a 68 year old woman with presumed endometrial cancer for which she underwent TRH/BSO/SLND but ultimately diagnosed adenocarcinoma of the endocervix (AJCC pT1b1 N0 M0, FIGO 2018 IB2 with close radial margin < 1 mm, 7 out 8 mm stromal invasion and indeterminate LVSI), followed by adjuvant RT to the pelvis 5,040cGy completed 7/19/19.

## 2019-08-28 NOTE — ASSESSMENT
[FreeTextEntry1] : 68 year old woman with presumed endometrial cancer for which she underwent TRH/BSO/SLND but ultimately diagnosed adenocarcinoma of the endocervix (AJCC pT1b1 N0 M0, FIGO 2018 IB2 with close radial margin < 1 mm, 7 out 8 mm stromal invasion and indeterminate LVSI), followed by adjuvant RT to the pelvis 5,040cGy completed 7/19/19.

## 2019-10-28 ENCOUNTER — FORM ENCOUNTER (OUTPATIENT)
Age: 69
End: 2019-10-28

## 2019-10-29 ENCOUNTER — OUTPATIENT (OUTPATIENT)
Dept: OUTPATIENT SERVICES | Facility: HOSPITAL | Age: 69
LOS: 1 days | End: 2019-10-29

## 2019-10-29 ENCOUNTER — APPOINTMENT (OUTPATIENT)
Dept: NUCLEAR MEDICINE | Facility: CLINIC | Age: 69
End: 2019-10-29
Payer: MEDICARE

## 2019-10-29 DIAGNOSIS — Z90.89 ACQUIRED ABSENCE OF OTHER ORGANS: Chronic | ICD-10-CM

## 2019-10-29 DIAGNOSIS — Z98.51 TUBAL LIGATION STATUS: Chronic | ICD-10-CM

## 2019-10-29 DIAGNOSIS — C53.9 MALIGNANT NEOPLASM OF CERVIX UTERI, UNSPECIFIED: ICD-10-CM

## 2019-10-29 DIAGNOSIS — Z96.651 PRESENCE OF RIGHT ARTIFICIAL KNEE JOINT: Chronic | ICD-10-CM

## 2019-10-29 PROCEDURE — 78815 PET IMAGE W/CT SKULL-THIGH: CPT | Mod: 26,PI

## 2019-11-07 ENCOUNTER — APPOINTMENT (OUTPATIENT)
Dept: HEMATOLOGY ONCOLOGY | Facility: CLINIC | Age: 69
End: 2019-11-07

## 2019-11-21 ENCOUNTER — APPOINTMENT (OUTPATIENT)
Dept: RADIATION ONCOLOGY | Facility: CLINIC | Age: 69
End: 2019-11-21
Payer: MEDICARE

## 2019-11-21 VITALS
HEIGHT: 67 IN | OXYGEN SATURATION: 97 % | BODY MASS INDEX: 27 KG/M2 | TEMPERATURE: 97.5 F | RESPIRATION RATE: 16 BRPM | HEART RATE: 97 BPM | WEIGHT: 172 LBS | SYSTOLIC BLOOD PRESSURE: 135 MMHG | DIASTOLIC BLOOD PRESSURE: 80 MMHG

## 2019-11-21 PROCEDURE — 99214 OFFICE O/P EST MOD 30 MIN: CPT

## 2019-11-21 RX ORDER — FLUOROURACIL 50 MG/G
5 CREAM TOPICAL
Refills: 0 | Status: DISCONTINUED | COMMUNITY
End: 2019-11-21

## 2019-11-21 RX ORDER — CHOLECALCIFEROL (VITAMIN D3) 1250 MCG
1.25 MG CAPSULE ORAL
Qty: 12 | Refills: 0 | Status: DISCONTINUED | COMMUNITY
Start: 2019-03-13 | End: 2019-11-21

## 2019-11-21 NOTE — HISTORY OF PRESENT ILLNESS
[FreeTextEntry1] : 69 year old woman with adenocarcinoma of the endocervix status post surgery and adjuvant RT to the pelvis 5,040cGy completed 7/19/19.\par \par Denies vaginal itch, discharge, or edema.  Not currently using vaginal dilator and not sexually active, but denies difficulty with pelvic examination.  Notes bowels are looser than baseline, but denies diarrhea or bother.  Slight mucous discharge, but no rectal blood.  No unexpected weight loss.\par Saw Dr. Shaikh 8/22/19, will see Dr. Aguilar 2/19/20, Dr Kelley 1/16/20.\par Suffered mechanical fall at home last week, landing on left hip, and taking Tylenol PRN.\par \par PET/CT scan 10/29/19:\par 1. No evidence of FDG-avid residual disease, status post hysterectomy and bilateral oophorectomy. \par 2. Nonspecific diffuse thyroid avidity, possibly thyroiditis. \par 3. Nonspecific small mildly FDG avid left suprahilar focus, probably reactive. \par 4. Nonspecific diffuse FDG activity in the fundus of the stomach, possibly inflammatory.

## 2019-11-21 NOTE — PHYSICAL EXAM
[Extraocular Movements] : extraocular movements were intact [Normal] : normal external genitalia [Normal Vagina] : normal vagina without lesions or masses [Normal Vaginal Cuff] : vaginal cuff without lesion or nodularity [Normal] : no focal deficits [de-identified] : Examination performed in the presence of a female chaperone.

## 2019-11-21 NOTE — DISEASE MANAGEMENT
[Pathological] : TNM Stage: p [IB] : IB [FreeTextEntry4] : adenocarcinoma of the endocervix [TTNM] : 1b [NTNM] : 0 [MTNM] : 0

## 2020-02-05 ENCOUNTER — APPOINTMENT (OUTPATIENT)
Dept: GYNECOLOGIC ONCOLOGY | Facility: CLINIC | Age: 70
End: 2020-02-05
Payer: COMMERCIAL

## 2020-02-05 VITALS — WEIGHT: 176 LBS | HEIGHT: 67 IN | BODY MASS INDEX: 27.62 KG/M2

## 2020-02-05 PROCEDURE — 99214 OFFICE O/P EST MOD 30 MIN: CPT

## 2020-02-05 NOTE — ASSESSMENT
[FreeTextEntry1] : 69 year old woman with presumed endometrial cancer for which she underwent TRH/BSO/SLND but ultimately diagnosed adenocarcinoma of the endocervix (AJCC pT1b1 N0 M0, FIGO 2018 IB2 with close radial margin < 1 mm, 7 out 8 mm stromal invasion and indeterminate LVSI), followed by adjuvant RT to the pelvis 5,040cGy completed 7/19/19. No evidence of disease.

## 2020-02-05 NOTE — HISTORY OF PRESENT ILLNESS
[FreeTextEntry1] : Pt is a 69 year old woman with presumed endometrial cancer for which she underwent TRH/BSO/SLND but ultimately diagnosed adenocarcinoma of the endocervix (AJCC pT1b1 N0 M0, FIGO 2018 IB2 with close radial margin < 1 mm, 7 out 8 mm stromal invasion and indeterminate LVSI), followed by adjuvant RT to the pelvis 5,040cGy completed 7/19/19.\par \par Only new concern is some sciatica which is managed by her PCP.

## 2020-02-05 NOTE — DISCUSSION/SUMMARY
[FreeTextEntry1] : Discussed the todays exam is LELAND. Continue to alternate visits between me and Dr. Cleaning. Will see her back in 6 months for surveillance visit.

## 2020-03-18 ENCOUNTER — APPOINTMENT (OUTPATIENT)
Dept: DERMATOLOGY | Facility: CLINIC | Age: 70
End: 2020-03-18

## 2020-04-17 ENCOUNTER — APPOINTMENT (OUTPATIENT)
Dept: RADIATION ONCOLOGY | Facility: CLINIC | Age: 70
End: 2020-04-17
Payer: MEDICARE

## 2020-04-17 PROCEDURE — 99213 OFFICE O/P EST LOW 20 MIN: CPT | Mod: 95

## 2020-04-17 NOTE — HISTORY OF PRESENT ILLNESS
[Home] : at home, [unfilled] , at the time of the visit. [Medical Office: ([facility])___] : at the medical office located in  [Patient] : the patient [Self] : self [Spouse] : spouse [Other:____] : [unfilled] [FreeTextEntry2] : Maggie Price [FreeTextEntry4] : Alix Bravo [FreeTextEntry1] : 69 year old woman with adenocarcinoma of the endocervix status post surgery and adjuvant RT to the pelvis 5,040cGy completed 7/19/19.\par \par Denies vaginal itch, vaginal discharge, pelvic edema, change in bowel, change in urination, fatigue or weight loss.  Denies any significant bother with last pelvic exam at [facility].\par

## 2020-04-17 NOTE — REASON FOR VISIT
[Routine Follow-Up] : routine follow-up visit for [Endometrial Cancer] : endometrial cancer [Spouse] : spouse

## 2020-04-17 NOTE — REVIEW OF SYSTEMS
[Negative] : Allergic/Immunologic [Constipation: Grade 0] : Constipation: Grade 0 [Diarrhea: Grade 0] : Diarrhea: Grade 0 [Edema Limbs: Grade 0] : Edema Limbs: Grade 0  [Fatigue: Grade 0] : Fatigue: Grade 0 [Localized Edema: Grade 0] : Localized Edema: Grade 0  [Neck Edema: Grade 0] : Neck Edema: Grade 0 [Urinary Incontinence: Grade 0] : Urinary Incontinence: Grade 0  [Dyspareunia: Grade 0] : Dyspareunia: Grade 0

## 2020-07-15 ENCOUNTER — APPOINTMENT (OUTPATIENT)
Dept: GYNECOLOGIC ONCOLOGY | Facility: CLINIC | Age: 70
End: 2020-07-15
Payer: MEDICARE

## 2020-07-15 PROCEDURE — 99214 OFFICE O/P EST MOD 30 MIN: CPT

## 2020-07-15 NOTE — HISTORY OF PRESENT ILLNESS
[FreeTextEntry1] : Pt is a 69 year old woman with presumed endometrial cancer for which she underwent TRH/BSO/SLND but ultimately diagnosed adenocarcinoma of the endocervix (AJCC pT1b1 N0 M0, FIGO 2018 IB2 with close radial margin < 1 mm, 7 out 8 mm stromal invasion and indeterminate LVSI), followed by adjuvant RT to the pelvis 5,040 cGy completed 7/19/19.\par \par No new gynecologic concerns.

## 2020-07-15 NOTE — PHYSICAL EXAM
[Absent] : Cervix: Absent [Normal] : Recto-Vaginal Exam: Normal [Restricted in physically strenuous activity but ambulatory and able to carry out work of a light or sedentary nature] : Status 1- Restricted in physically strenuous activity but ambulatory and able to carry out work of a light or sedentary nature, e.g., light house work, office work

## 2020-07-15 NOTE — ASSESSMENT
[FreeTextEntry1] : Pt 69 year old woman with presumed endometrial cancer for which she underwent TRH/BSO/SLND but ultimately diagnosed adenocarcinoma of the endocervix (AJCC pT1b1 N0 M0, FIGO 2018 IB2 with close radial margin < 1 mm, 7 out 8 mm stromal invasion and indeterminate LVSI), followed by adjuvant RT to the pelvis 5,040cGy completed 7/19/19. No evidence of disease.

## 2020-10-21 ENCOUNTER — APPOINTMENT (OUTPATIENT)
Dept: RADIATION ONCOLOGY | Facility: CLINIC | Age: 70
End: 2020-10-21
Payer: MEDICARE

## 2020-10-21 ENCOUNTER — NON-APPOINTMENT (OUTPATIENT)
Age: 70
End: 2020-10-21

## 2020-10-21 VITALS
BODY MASS INDEX: 29.19 KG/M2 | HEART RATE: 70 BPM | WEIGHT: 186 LBS | HEIGHT: 67 IN | SYSTOLIC BLOOD PRESSURE: 148 MMHG | DIASTOLIC BLOOD PRESSURE: 85 MMHG | RESPIRATION RATE: 16 BRPM | OXYGEN SATURATION: 94 %

## 2020-10-21 PROCEDURE — 99214 OFFICE O/P EST MOD 30 MIN: CPT

## 2020-10-21 NOTE — HISTORY OF PRESENT ILLNESS
[FreeTextEntry1] : 70 year old woman with endocervical adenocarcinoma (FIGO IB2), status post surgery and adjuvant radiation completed 7/2019.\par \par Reports overall stable and good health over the past 6 months.\par Has noted some weight gain, and is now monitoring her blood pressure.  Denies changes in bowel habits, abdominal or pelvic discomfort, vaginal discharge, or fatigue.  Has stable urinary stress incontinence.  Also noted mild soreness of the left flank following trauma, which has improved with time.\par \par Dr. Aguilar\par

## 2021-01-06 ENCOUNTER — APPOINTMENT (OUTPATIENT)
Dept: GYNECOLOGIC ONCOLOGY | Facility: CLINIC | Age: 71
End: 2021-01-06
Payer: MEDICARE

## 2021-01-06 DIAGNOSIS — L90.0 LICHEN SCLEROSUS ET ATROPHICUS: ICD-10-CM

## 2021-01-06 PROCEDURE — 99214 OFFICE O/P EST MOD 30 MIN: CPT

## 2021-01-06 NOTE — ASSESSMENT
[FreeTextEntry1] : Pt 70 year old woman with presumed endometrial cancer for which she underwent TRH/BSO/SLND but ultimately diagnosed adenocarcinoma of the endocervix (AJCC pT1b1 N0 M0, FIGO 2018 IB2 with close radial margin < 1 mm, 7 out 8 mm stromal invasion and indeterminate LVSI), followed by adjuvant RT to the pelvis 5,040cGy completed 7/19/19. No evidence of disease. Vulvar erythema will treat for lichen sclerosus with clobetasol.

## 2021-01-06 NOTE — DISCUSSION/SUMMARY
[FreeTextEntry1] : Discussed the todays exam is LELAND. Continue to alternate visits between me and Dr. Cleaning. Will see her back in 6 months for surveillance visit. \par \par The vulvar is erythematous with loss of labia minora consistent with lichen sclerosus. I recommend treatment with clobetasol. Discussed risks of lichen sclerosus if untreated for vulvar carcinoma.

## 2021-01-06 NOTE — HISTORY OF PRESENT ILLNESS
[FreeTextEntry1] : Pt is a 70 year old woman with presumed endometrial cancer for which she underwent TRH/BSO/SLND but ultimately diagnosed adenocarcinoma of the endocervix (AJCC pT1b1 N0 M0, FIGO 2018 IB2 with close radial margin < 1 mm, 7 out 8 mm stromal invasion and indeterminate LVSI), followed by adjuvant RT to the pelvis 5,040 cGy completed 7/19/19.\par \par No new gynecologic concerns. No changes in surgical/medical history, no new medications.

## 2021-01-08 ENCOUNTER — NON-APPOINTMENT (OUTPATIENT)
Age: 71
End: 2021-01-08

## 2021-02-02 ENCOUNTER — APPOINTMENT (OUTPATIENT)
Dept: GYNECOLOGIC ONCOLOGY | Facility: CLINIC | Age: 71
End: 2021-02-02

## 2021-04-06 ENCOUNTER — NON-APPOINTMENT (OUTPATIENT)
Age: 71
End: 2021-04-06

## 2021-05-07 ENCOUNTER — APPOINTMENT (OUTPATIENT)
Dept: RADIATION ONCOLOGY | Facility: CLINIC | Age: 71
End: 2021-05-07
Payer: MEDICARE

## 2021-05-07 ENCOUNTER — NON-APPOINTMENT (OUTPATIENT)
Age: 71
End: 2021-05-07

## 2021-05-07 VITALS
TEMPERATURE: 98 F | HEIGHT: 67 IN | WEIGHT: 184 LBS | DIASTOLIC BLOOD PRESSURE: 76 MMHG | HEART RATE: 86 BPM | BODY MASS INDEX: 28.88 KG/M2 | OXYGEN SATURATION: 86 % | SYSTOLIC BLOOD PRESSURE: 125 MMHG | RESPIRATION RATE: 16 BRPM

## 2021-05-07 PROCEDURE — 99214 OFFICE O/P EST MOD 30 MIN: CPT

## 2021-05-07 NOTE — PHYSICAL EXAM
[Extraocular Movements] : extraocular movements were intact [Outer Ear] : the ears and nose were normal in appearance [Bowel Sounds] : normal bowel sounds [Abdomen Soft] : soft [Nondistended] : nondistended [Normal Vaginal Cuff] : vaginal cuff without lesion or nodularity [Normal] : no focal deficits [de-identified] : Examination performed in the presence of a female chaperone.

## 2021-05-07 NOTE — HISTORY OF PRESENT ILLNESS
[FreeTextEntry1] : 70 year old woman with endocervical adenocarcinoma (FIGO IB2), status post surgery and adjuvant radiation completed 7/2019.\par \par Last saw Dr. Aguilar on 1/6/21; erythematous vulva with loss of labia minora, consistent with lichen sclerosus.  Clobetasol prescribed; patient applied, but had adverse reaction and discontinued use.\par \par Overall stable health.\par Received COVID-19 vaccine x2 ([employer] in Feb/March 2021).\par Denies vaginal itch, vaginal discharge, pelvic edema, change in bowel, change in urination, or fatigue.

## 2021-08-17 ENCOUNTER — APPOINTMENT (OUTPATIENT)
Dept: GYNECOLOGIC ONCOLOGY | Facility: CLINIC | Age: 71
End: 2021-08-17
Payer: MEDICARE

## 2021-08-17 VITALS
HEIGHT: 67 IN | WEIGHT: 177 LBS | DIASTOLIC BLOOD PRESSURE: 76 MMHG | HEART RATE: 76 BPM | OXYGEN SATURATION: 97 % | BODY MASS INDEX: 27.78 KG/M2 | SYSTOLIC BLOOD PRESSURE: 124 MMHG

## 2021-08-17 PROCEDURE — 99214 OFFICE O/P EST MOD 30 MIN: CPT

## 2021-08-17 NOTE — DISCUSSION/SUMMARY
[FreeTextEntry1] : Discussed the today exam is LELAND. Continue to alternate visits between me and Dr. Cleaning. Will see her back in 6 months for surveillance visit. \par \par The vulvar is erythematous with loss of labia minora consistent with lichen sclerosus with area of leukoplakia at 6 o'clock. The patient had adverse reaction to clobetasol with worsening of erythema. Will hold off at this time and re-evaluate the vulva next visit. The patient likely had allergic reaction to the clobetasol, which is supposed to decrease inflammation.

## 2021-08-17 NOTE — HISTORY OF PRESENT ILLNESS
[FreeTextEntry1] : Pt is a 70 year old woman with presumed endometrial cancer for which she underwent TRH/BSO/SLND but ultimately diagnosed adenocarcinoma of the endocervix (AJCC pT1b1 N0 M0, FIGO 2018 IB2 with close radial margin < 1 mm, 7 out 8 mm stromal invasion and indeterminate LVSI), followed by adjuvant RT to the pelvis 5,040 cGy completed 7/19/19.\par \par No new gynecologic concerns. No changes in surgical/medical history, no new medications. she reports reaction to the clobetasol causing severe irritation of the vulva. She reports the inflammation has since resolved.

## 2021-08-17 NOTE — END OF VISIT
[FreeTextEntry3] : Continue surveillance visits between Dr. Cleaning (3 months) and Myself in 6 months

## 2021-10-11 ENCOUNTER — OUTPATIENT (OUTPATIENT)
Dept: OUTPATIENT SERVICES | Facility: HOSPITAL | Age: 71
LOS: 1 days | End: 2021-10-11
Payer: MEDICARE

## 2021-10-11 ENCOUNTER — APPOINTMENT (OUTPATIENT)
Dept: RADIOLOGY | Facility: CLINIC | Age: 71
End: 2021-10-11
Payer: MEDICARE

## 2021-10-11 DIAGNOSIS — Z90.89 ACQUIRED ABSENCE OF OTHER ORGANS: Chronic | ICD-10-CM

## 2021-10-11 DIAGNOSIS — Z98.51 TUBAL LIGATION STATUS: Chronic | ICD-10-CM

## 2021-10-11 DIAGNOSIS — Z00.8 ENCOUNTER FOR OTHER GENERAL EXAMINATION: ICD-10-CM

## 2021-10-11 DIAGNOSIS — Z96.651 PRESENCE OF RIGHT ARTIFICIAL KNEE JOINT: Chronic | ICD-10-CM

## 2021-10-11 PROCEDURE — 77080 DXA BONE DENSITY AXIAL: CPT

## 2021-10-11 PROCEDURE — 77080 DXA BONE DENSITY AXIAL: CPT | Mod: 26

## 2021-10-15 ENCOUNTER — NON-APPOINTMENT (OUTPATIENT)
Age: 71
End: 2021-10-15

## 2021-11-09 ENCOUNTER — APPOINTMENT (OUTPATIENT)
Dept: RADIATION ONCOLOGY | Facility: CLINIC | Age: 71
End: 2021-11-09
Payer: MEDICARE

## 2021-11-09 ENCOUNTER — APPOINTMENT (OUTPATIENT)
Dept: RHEUMATOLOGY | Facility: CLINIC | Age: 71
End: 2021-11-09
Payer: MEDICARE

## 2021-11-09 ENCOUNTER — NON-APPOINTMENT (OUTPATIENT)
Age: 71
End: 2021-11-09

## 2021-11-09 VITALS
RESPIRATION RATE: 16 BRPM | HEART RATE: 76 BPM | OXYGEN SATURATION: 97 % | WEIGHT: 168 LBS | HEIGHT: 67 IN | DIASTOLIC BLOOD PRESSURE: 71 MMHG | BODY MASS INDEX: 26.37 KG/M2 | TEMPERATURE: 97 F | SYSTOLIC BLOOD PRESSURE: 123 MMHG

## 2021-11-09 VITALS
HEART RATE: 81 BPM | OXYGEN SATURATION: 96 % | DIASTOLIC BLOOD PRESSURE: 77 MMHG | SYSTOLIC BLOOD PRESSURE: 135 MMHG | HEIGHT: 67 IN | BODY MASS INDEX: 26.37 KG/M2 | WEIGHT: 168 LBS

## 2021-11-09 PROCEDURE — 99214 OFFICE O/P EST MOD 30 MIN: CPT

## 2021-11-09 PROCEDURE — 36415 COLL VENOUS BLD VENIPUNCTURE: CPT

## 2021-11-09 PROCEDURE — 99204 OFFICE O/P NEW MOD 45 MIN: CPT | Mod: 25

## 2021-11-09 RX ORDER — CLOBETASOL PROPIONATE 0.5 MG/G
0.05 CREAM TOPICAL
Qty: 1 | Refills: 2 | Status: DISCONTINUED | COMMUNITY
Start: 2021-01-06 | End: 2021-11-09

## 2021-11-09 NOTE — PHYSICAL EXAM
[Sclera] : the sclera and conjunctiva were normal [Extraocular Movements] : extraocular movements were intact [Outer Ear] : the ears and nose were normal in appearance [Bowel Sounds] : normal bowel sounds [Abdomen Soft] : soft [Nondistended] : nondistended [Abdomen Tenderness] : non-tender [Normal External Genitalia] : normal external genitalia  [Normal Vaginal Cuff] : vaginal cuff without lesion or nodularity [Inguinal Lymph Nodes Enlarged Bilaterally] : inguinal [Motor Tone] : muscle strength and tone were normal [Normal] : no focal deficits [de-identified] : Examination performed in the presence of a female chaperone.

## 2021-11-09 NOTE — ASSESSMENT
[FreeTextEntry1] : 72 y/o female w/ Hx of uterine CA s/p hysterectomy and radiation treatment in remission referred to rheumatology for osteoporosis. No Hx of fractures. No family Hx of osteoporosis or fracture. Pt has not been on any long-term steroids. Denies any symptoms other than b/l knee OA pains. Pt does not have any dental procedures scheduled or needed. Pt has GERD but no dysphagia. Pt's osteoporosis likely associated with pt's age and female gender.\par \par - Obtain Ca, Phos, Alk Phos, Vit D 25-OH, TSH to evaluate for secondary causes of osteoporosis and medication safety.\par - Start Reclast 5mg IV once yearly. Consider drug holiday in 2023 after 3rd dose of Reclast.\par - Side effects of bisphosphonates were discussed with the pt in detail including bone pain and flu-like illness x 2-3 days, ONJ, atypical femoral fractures. Avoid if CrCl < 30, malabsorption, plan for invasive oral procedure, pregnancy, breastfeeding.\par - Last DXA scan on 10/2021 with lowest T-score femoral neck –2.7. Next DXA due on 10/2023.\par - Pt to continue Centrum Silver MV daily (has Vit D 1000IU daily and Ca 300mg daily). Advised to add Calcium 1000mg daily, but will let pt know if Ca is too high on workup. \par - Advised to increase exercise, and minimize fall risk.\par - RTC in 1 year prior to next infusion of Reclast for follow up\par

## 2021-11-09 NOTE — HISTORY OF PRESENT ILLNESS
[FreeTextEntry1] : 71 year old woman with endocervical adenocarcinoma (FIGO IB2) status post surgery and adjuvant radiation completed 7/2019.\par \par Overall, reports feeling well.\par 8/12/21 screening mammogram showed no evidence of malignancy, BI-RADS 2.\par Underwent bone density exam 10/11/21, which noted osteoporosis.\par Has been referred to Dr. Arana (rheum); appointment later today.\par Denies any vaginal discharge or genital edema.  No pelvic cramping or pain.  No change in urinary habits.  Bowels have been relatively stable, managed mostly with diet.

## 2021-11-09 NOTE — HISTORY OF PRESENT ILLNESS
[FreeTextEntry1] : 70 y/o female w/ Hx of uterine CA s/p hysterectomy and radiation treatment in remission referred to rheumatology for osteoporosis.\par No Hx of fractures. No family Hx of osteoporosis or fracture. Pt has not been on any long-term steroids.\par Pt has had knee OA over her lifetime.\par Pt states that she thinks she used to take Fosamax for ?osteoporosis many years ago. Does not think she had any major side effects. Pt does not have any dental procedures scheduled or needed. Pt has GERD but no dysphagia.\par Denies any symptoms except b/kl knee pains.\par \par WORKUP: \par DXA (10/2021): Lowest T-score femoral neck –2.7 \par DXA (2/2019):Lowest T-score L hip –2.4\par

## 2021-11-10 LAB
25(OH)D3 SERPL-MCNC: 33.3 NG/ML
ALBUMIN SERPL ELPH-MCNC: 4.1 G/DL
ALP BLD-CCNC: 63 U/L
ALT SERPL-CCNC: 13 U/L
ANION GAP SERPL CALC-SCNC: 12 MMOL/L
AST SERPL-CCNC: 17 U/L
BASOPHILS # BLD AUTO: 0.03 K/UL
BASOPHILS NFR BLD AUTO: 0.7 %
BILIRUB SERPL-MCNC: 0.4 MG/DL
BUN SERPL-MCNC: 13 MG/DL
CALCIUM SERPL-MCNC: 9.4 MG/DL
CHLORIDE SERPL-SCNC: 105 MMOL/L
CO2 SERPL-SCNC: 25 MMOL/L
CREAT SERPL-MCNC: 0.9 MG/DL
EOSINOPHIL # BLD AUTO: 0.17 K/UL
EOSINOPHIL NFR BLD AUTO: 3.8 %
GLUCOSE SERPL-MCNC: 97 MG/DL
HCT VFR BLD CALC: 39.2 %
HGB BLD-MCNC: 12.5 G/DL
IMM GRANULOCYTES NFR BLD AUTO: 0.2 %
LYMPHOCYTES # BLD AUTO: 0.88 K/UL
LYMPHOCYTES NFR BLD AUTO: 19.8 %
MAN DIFF?: NORMAL
MCHC RBC-ENTMCNC: 31.9 GM/DL
MCHC RBC-ENTMCNC: 33.2 PG
MCV RBC AUTO: 104 FL
MONOCYTES # BLD AUTO: 0.43 K/UL
MONOCYTES NFR BLD AUTO: 9.7 %
NEUTROPHILS # BLD AUTO: 2.92 K/UL
NEUTROPHILS NFR BLD AUTO: 65.8 %
PHOSPHATE SERPL-MCNC: 3.3 MG/DL
PLATELET # BLD AUTO: 211 K/UL
POTASSIUM SERPL-SCNC: 4.7 MMOL/L
PROT SERPL-MCNC: 6.4 G/DL
RBC # BLD: 3.77 M/UL
RBC # FLD: 13.7 %
SODIUM SERPL-SCNC: 142 MMOL/L
TSH SERPL-ACNC: 4.92 UIU/ML
WBC # FLD AUTO: 4.44 K/UL

## 2021-11-22 ENCOUNTER — NON-APPOINTMENT (OUTPATIENT)
Age: 71
End: 2021-11-22

## 2021-11-24 ENCOUNTER — NON-APPOINTMENT (OUTPATIENT)
Age: 71
End: 2021-11-24

## 2021-11-25 LAB
CREAT 24H UR-MCNC: 0.8 G/24 H
CREAT ?TM UR-MCNC: 26 MG/DL
PROT ?TM UR-MCNC: 24 HR
SPECIMEN VOL 24H UR: 3000 ML

## 2021-11-26 LAB
BSA DERIVED: 1.73 M2
CREAT 24H UR-MCNC: 0.8 G/24 H
CREAT ?TM UR-MCNC: 26 MG/DL
CREAT CL 24H UR+SERPL-VRATE: 69 ML/MIN
PROT ?TM UR-MCNC: 24 HR
SPECIMEN VOL 24H UR: 3000 ML

## 2021-11-30 ENCOUNTER — APPOINTMENT (OUTPATIENT)
Dept: RHEUMATOLOGY | Facility: CLINIC | Age: 71
End: 2021-11-30
Payer: MEDICARE

## 2021-11-30 VITALS
RESPIRATION RATE: 17 BRPM | HEART RATE: 69 BPM | SYSTOLIC BLOOD PRESSURE: 110 MMHG | DIASTOLIC BLOOD PRESSURE: 70 MMHG | OXYGEN SATURATION: 97 %

## 2021-11-30 PROCEDURE — 96374 THER/PROPH/DIAG INJ IV PUSH: CPT

## 2021-11-30 RX ORDER — ZOLEDRONIC ACID 5 MG/100ML
5 INJECTION INTRAVENOUS
Qty: 0 | Refills: 0 | Status: COMPLETED | OUTPATIENT
Start: 2021-11-30

## 2022-02-02 NOTE — DISEASE MANAGEMENT
[Pathological] : TNM Stage: p [IB] : IB [FreeTextEntry4] : adenocarcinoma endocervix [NTNM] : 0 [TTNM] : 1b [de-identified] : 4770 [MTNM] : 0 [de-identified] : 3103 [de-identified] : pelvis needed assist

## 2022-02-08 ENCOUNTER — APPOINTMENT (OUTPATIENT)
Dept: GYNECOLOGIC ONCOLOGY | Facility: CLINIC | Age: 72
End: 2022-02-08
Payer: MEDICARE

## 2022-02-08 VITALS
RESPIRATION RATE: 16 BRPM | DIASTOLIC BLOOD PRESSURE: 69 MMHG | OXYGEN SATURATION: 96 % | WEIGHT: 168 LBS | HEIGHT: 67 IN | BODY MASS INDEX: 26.37 KG/M2 | SYSTOLIC BLOOD PRESSURE: 138 MMHG | HEART RATE: 65 BPM

## 2022-02-08 PROCEDURE — 99213 OFFICE O/P EST LOW 20 MIN: CPT

## 2022-02-08 NOTE — DISCUSSION/SUMMARY
[FreeTextEntry1] : PT is a 70 yo with endocervical adenocarcinoma (FIGO IB2) status post surgery and adjuvant radiation completed 7/2019. She has no evidence of disease and she is now >2 years since diagnosis. We will start doing surveillance visits every 6 months alternating between myself and Dr. Cleaning. \par \par She is up to date on her preventative health care. She has worsening osteoporosis and received treatment. She will need follow up DEXA scan within the next 1-2 years. \par

## 2022-02-08 NOTE — PHYSICAL EXAM
[Sclera] : the sclera and conjunctiva were normal [Extraocular Movements] : extraocular movements were intact [Outer Ear] : the ears and nose were normal in appearance [Bowel Sounds] : normal bowel sounds [Abdomen Soft] : soft [Nondistended] : nondistended [Abdomen Tenderness] : non-tender [Normal External Genitalia] : normal external genitalia  [Normal Vaginal Cuff] : vaginal cuff without lesion or nodularity [Inguinal Lymph Nodes Enlarged Bilaterally] : inguinal [Motor Tone] : muscle strength and tone were normal [Normal] : oriented to person, place and time, the affect was normal, the mood was normal and not anxious [Chaperone Present] : A chaperone was present in the examining room during all aspects of the physical examination [FreeTextEntry1] : Vicki Rosario MA was present the entire duration of the patient interaction and gynecological exam. [Ambulatory and capable of all self care but unable to carry out any work activities] : Status 2- Ambulatory and capable of all self care but unable to carry out any work activities. Up and about more than 50% of waking hours [de-identified] : radiation changes

## 2022-02-08 NOTE — REVIEW OF SYSTEMS
[Negative] : Allergic/Immunologic [Constipation: Grade 0] : Constipation: Grade 0 [Diarrhea: Grade 0] : Diarrhea: Grade 0 [Edema Limbs: Grade 0] : Edema Limbs: Grade 0  [Fatigue: Grade 0] : Fatigue: Grade 0 [Localized Edema: Grade 0] : Localized Edema: Grade 0  [Neck Edema: Grade 0] : Neck Edema: Grade 0 [Urinary Incontinence: Grade 0] : Urinary Incontinence: Grade 0  [Urinary Tract Pain: Grade 0] : Urinary Tract Pain: Grade 0 [Genital Edema: Grade 0] : Genital Edema: Grade 0 [Vaginal Stricture: Grade 0] : Vaginal Stricture: Grade 0 [Vaginal Infection: Grade 0] : Vaginal Infection: Grade 0  [Dyspareunia: Grade 0] : Dyspareunia: Grade 0

## 2022-02-08 NOTE — HISTORY OF PRESENT ILLNESS
[FreeTextEntry1] : 71 year old woman with endocervical adenocarcinoma (FIGO IB2) status post surgery and adjuvant radiation completed 7/2019.\par \par Overall, reports feeling well.\par 8/12/21 screening mammogram showed no evidence of malignancy, BI-RADS 2.\par Underwent bone density exam 10/11/21, which noted osteoporosis. She had injection with zolendronic acid. Needs follow up DEXA scan in 1-2 years.\par Denies any vaginal discharge or genital edema.  No pelvic cramping or pain.  No change in urinary habits.  Bowels have been relatively stable, managed mostly with diet.

## 2022-05-10 ENCOUNTER — APPOINTMENT (OUTPATIENT)
Dept: RADIATION ONCOLOGY | Facility: CLINIC | Age: 72
End: 2022-05-10
Payer: MEDICARE

## 2022-05-10 ENCOUNTER — NON-APPOINTMENT (OUTPATIENT)
Age: 72
End: 2022-05-10

## 2022-05-10 VITALS
DIASTOLIC BLOOD PRESSURE: 76 MMHG | SYSTOLIC BLOOD PRESSURE: 174 MMHG | OXYGEN SATURATION: 97 % | RESPIRATION RATE: 16 BRPM | BODY MASS INDEX: 28.35 KG/M2 | HEART RATE: 77 BPM | WEIGHT: 181 LBS

## 2022-05-10 PROCEDURE — 99214 OFFICE O/P EST MOD 30 MIN: CPT

## 2022-05-10 NOTE — HISTORY OF PRESENT ILLNESS
[FreeTextEntry1] : 71 year old woman with endocervical adenocarcinoma (FIGO IB2) status post surgery and adjuvant radiation completed 7/2019.\par \par No new medical issues noted.\par Doing well from gynecologic standpoint.\par No vaginal itch or discharge, pelvic edema, change in bowel, change in urination, or unintentional weight loss.\par \par [facility]yn[facility]c: [facility] \par Rheum: [facility]

## 2022-05-10 NOTE — REVIEW OF SYSTEMS
[Negative] : Allergic/Immunologic [Constipation: Grade 0] : Constipation: Grade 0 [Diarrhea: Grade 0] : Diarrhea: Grade 0 [Edema Limbs: Grade 0] : Edema Limbs: Grade 0  [Fatigue: Grade 0] : Fatigue: Grade 0 [Localized Edema: Grade 0] : Localized Edema: Grade 0  [Neck Edema: Grade 0] : Neck Edema: Grade 0 [Hematuria: Grade 0] : Hematuria: Grade 0 [Urinary Incontinence: Grade 0] : Urinary Incontinence: Grade 0  [Urinary Retention: Grade 0] : Urinary Retention: Grade 0 [Urinary Tract Pain: Grade 0] : Urinary Tract Pain: Grade 0 [Urinary Urgency: Grade 0] : Urinary Urgency: Grade 0 [Urinary Frequency: Grade 0] : Urinary Frequency: Grade 0 [Dyspareunia: Grade 0] : Dyspareunia: Grade 0

## 2022-09-07 ENCOUNTER — APPOINTMENT (OUTPATIENT)
Dept: RHEUMATOLOGY | Facility: CLINIC | Age: 72
End: 2022-09-07

## 2022-09-07 VITALS
OXYGEN SATURATION: 96 % | HEART RATE: 73 BPM | WEIGHT: 183 LBS | TEMPERATURE: 97.2 F | HEIGHT: 67 IN | DIASTOLIC BLOOD PRESSURE: 77 MMHG | SYSTOLIC BLOOD PRESSURE: 131 MMHG | BODY MASS INDEX: 28.72 KG/M2

## 2022-09-07 DIAGNOSIS — E55.9 VITAMIN D DEFICIENCY, UNSPECIFIED: ICD-10-CM

## 2022-09-07 PROCEDURE — 36415 COLL VENOUS BLD VENIPUNCTURE: CPT

## 2022-09-07 PROCEDURE — 99214 OFFICE O/P EST MOD 30 MIN: CPT | Mod: 25

## 2022-09-07 NOTE — ASSESSMENT
[FreeTextEntry1] : 73 y/o female w/ Hx of uterine CA s/p hysterectomy and radiation treatment in remission referred to rheumatology for osteoporosis. No Hx of fractures. No family Hx of osteoporosis or fracture. Pt has not been on any long-term steroids. Denies any symptoms other than b/l knee OA pains. Pt does not have any dental procedures scheduled or needed. Pt has GERD but no dysphagia. Pt's osteoporosis likely associated with pt's age and female gender. \par \par Labs for secondary causes of osteoporosis negative. Pt had first Reclast infusion in 11/2021 without any side effects. Pt due for next Reclast infusion in 11/2022.\par \par - Obtain labs for Reclast medication safety. Consider drug holiday in 2023 after 3rd dose of Reclast. \par - Will order Reclast for 11/2022.\par - Side effects of bisphosphonates were discussed with the pt in detail including bone pain and flu-like illness x 2-3 days, ONJ, atypical femoral fractures. Avoid if CrCl < 30, malabsorption, plan for invasive oral procedure, pregnancy, breastfeeding. \par - Last DXA scan on 10/2021 with lowest T-score femoral neck –2.7. Next DXA due on 10/2023. \par - Pt to continue Ca and Vit D supplementation.\par - Advised to increase weight bearing exercise, and minimize fall risk. \par - Will contact pt if any abnormal labwork. RTC in 1 year prior to next infusion of Reclast for follow up

## 2022-09-07 NOTE — HISTORY OF PRESENT ILLNESS
[FreeTextEntry1] : HISTORY: \par 73 y/o female w/ Hx of uterine CA s/p hysterectomy and radiation treatment in remission referred to rheumatology for osteoporosis. No Hx of fractures. No family Hx of osteoporosis or fracture. Pt has not been on any long-term steroids. Denies any symptoms other than b/l knee OA pains. Pt does not have any dental procedures scheduled or needed. Pt has GERD but no dysphagia. Pt's osteoporosis likely associated with pt's age and female gender. \par \par INTERVAL HISTORY: \par Labs for secondary causes of osteoporosis negative. \par Pt had first Reclast infusion in 11/2021 without any side effects. Pt reports general hair loss but not specifically related temporally to Reclast infusion.\par Denies any fractures, other major medical conditions since last visit.\par

## 2022-09-08 LAB
25(OH)D3 SERPL-MCNC: 38 NG/ML
ALBUMIN SERPL ELPH-MCNC: 4.2 G/DL
ALP BLD-CCNC: 51 U/L
ALT SERPL-CCNC: 13 U/L
ANION GAP SERPL CALC-SCNC: 9 MMOL/L
AST SERPL-CCNC: 17 U/L
BASOPHILS # BLD AUTO: 0.03 K/UL
BASOPHILS NFR BLD AUTO: 0.9 %
BILIRUB SERPL-MCNC: 0.4 MG/DL
BUN SERPL-MCNC: 15 MG/DL
CALCIUM SERPL-MCNC: 9.5 MG/DL
CHLORIDE SERPL-SCNC: 105 MMOL/L
CO2 SERPL-SCNC: 28 MMOL/L
CREAT SERPL-MCNC: 0.83 MG/DL
EGFR: 74 ML/MIN/1.73M2
EOSINOPHIL # BLD AUTO: 0.14 K/UL
EOSINOPHIL NFR BLD AUTO: 4.2 %
GLUCOSE SERPL-MCNC: 96 MG/DL
HCT VFR BLD CALC: 38.8 %
HGB BLD-MCNC: 13 G/DL
IMM GRANULOCYTES NFR BLD AUTO: 0.3 %
LYMPHOCYTES # BLD AUTO: 0.67 K/UL
LYMPHOCYTES NFR BLD AUTO: 20.2 %
MAN DIFF?: NORMAL
MCHC RBC-ENTMCNC: 33.5 GM/DL
MCHC RBC-ENTMCNC: 33.8 PG
MCV RBC AUTO: 100.8 FL
MONOCYTES # BLD AUTO: 0.33 K/UL
MONOCYTES NFR BLD AUTO: 10 %
NEUTROPHILS # BLD AUTO: 2.13 K/UL
NEUTROPHILS NFR BLD AUTO: 64.4 %
PLATELET # BLD AUTO: 236 K/UL
POTASSIUM SERPL-SCNC: 5.1 MMOL/L
PROT SERPL-MCNC: 6.5 G/DL
RBC # BLD: 3.85 M/UL
RBC # FLD: 13.4 %
SODIUM SERPL-SCNC: 142 MMOL/L
WBC # FLD AUTO: 3.31 K/UL

## 2022-09-11 LAB — CA-I SERPL-SCNC: 5.1 MG/DL

## 2022-12-09 ENCOUNTER — NON-APPOINTMENT (OUTPATIENT)
Age: 72
End: 2022-12-09

## 2022-12-12 RX ORDER — ZOLEDRONIC ACID 5 MG/100ML
5 INJECTION INTRAVENOUS
Qty: 1 | Refills: 0 | Status: ACTIVE | COMMUNITY
Start: 2021-11-09

## 2022-12-14 ENCOUNTER — APPOINTMENT (OUTPATIENT)
Dept: RHEUMATOLOGY | Facility: CLINIC | Age: 72
End: 2022-12-14

## 2022-12-14 VITALS
HEART RATE: 70 BPM | OXYGEN SATURATION: 95 % | SYSTOLIC BLOOD PRESSURE: 150 MMHG | RESPIRATION RATE: 18 BRPM | TEMPERATURE: 98 F | DIASTOLIC BLOOD PRESSURE: 73 MMHG

## 2022-12-14 PROCEDURE — 96374 THER/PROPH/DIAG INJ IV PUSH: CPT

## 2022-12-14 RX ORDER — ZOLEDRONIC ACID 5 MG/100ML
5 INJECTION INTRAVENOUS
Qty: 0 | Refills: 0 | Status: COMPLETED | OUTPATIENT
Start: 2022-12-09

## 2022-12-14 NOTE — HISTORY OF PRESENT ILLNESS
[Denies] : Denies [No] : No [Yes] : Yes [Declined] : Declined [Informed consent documented in EHR.] : Informed consent documented in EHR. [Mg] : magnesium [Vitamin D] : Vitamin D [Bone Density/DEXA] : Bone Density/DEXA [Left upper extremity] : Left upper extremity [22g] : 22g [Start Time: ___] : Medication Start Time: [unfilled] [End Time: ___] : Medication End Time: [unfilled] [IV discontinued. Intact. No signs or symptoms of IV complications noted. Time: ___] : IV discontinued. Intact. No signs or symptoms of IV complications noted. Time: [unfilled] [Patient  instructed to seek medical attention with signs and symptoms of adverse effects] : Patient  instructed to seek medical attention with signs and symptoms of adverse effects [Patient left unit in no acute distress] : Patient left unit in no acute distress [Medications administered as ordered and tolerated well.] : Medications administered as ordered and tolerated well. [de-identified] : RPA

## 2023-03-16 ENCOUNTER — APPOINTMENT (OUTPATIENT)
Dept: GYNECOLOGIC ONCOLOGY | Facility: CLINIC | Age: 73
End: 2023-03-16
Payer: MEDICARE

## 2023-03-16 ENCOUNTER — NON-APPOINTMENT (OUTPATIENT)
Age: 73
End: 2023-03-16

## 2023-03-16 VITALS
SYSTOLIC BLOOD PRESSURE: 117 MMHG | WEIGHT: 193 LBS | OXYGEN SATURATION: 98 % | HEART RATE: 72 BPM | HEIGHT: 67 IN | BODY MASS INDEX: 30.29 KG/M2 | DIASTOLIC BLOOD PRESSURE: 73 MMHG

## 2023-03-16 PROCEDURE — 99213 OFFICE O/P EST LOW 20 MIN: CPT

## 2023-03-16 NOTE — HISTORY OF PRESENT ILLNESS
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 Patient also follows with . \par \par She reports no new gynecologic concerns. No vaginal bleeding or discharge. She is getting infusions for osteoporosis with a rheumatologist and has had 2 injections. \par \par Health Maintenance:\par Mammogram: normal\par Colonoscopy: 2 normal in the past, not interested in another one\par DEXA: osteoporosis on treatment, will follow up per rheumatologist\par Vaccines: will discuss shingless vaccine with PCP, COVID and Influenza vaccinated\par

## 2023-03-16 NOTE — ASSESSMENT
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 Patient also follows with . \par \par No evidence of disease on physical exam and no new concerning symptoms. \par \par We discussed importance of having gynecologic follow up after 5 years of surveillance. She will see me at [facility] office and will then follow up with Dr. Chacon or Mindy for yearly surveillance.

## 2023-03-16 NOTE — PHYSICAL EXAM
[Chaperone Present] : A chaperone was present in the examining room during all aspects of the physical examination [FreeTextEntry1] : Mitali Jensen MA was present the entire duration of the patient interaction and gynecological exam. [Absent] : Adnexa(ae): Absent [Normal] : Anus and perineum: Normal sphincter tone, no masses, no prolapse. [Restricted in physically strenuous activity but ambulatory and able to carry out work of a light or sedentary nature] : Status 1- Restricted in physically strenuous activity but ambulatory and able to carry out work of a light or sedentary nature, e.g., light house work, office work

## 2023-03-16 NOTE — REASON FOR VISIT
[FreeTextEntry1] : [place] Location \par [facility] \par  \par [address] \par [place], [address] \par [phone]\par

## 2023-03-16 NOTE — END OF VISIT
[FreeTextEntry3] : RTC in 1 year for surveillance [FreeTextEntry2] : Ladi Elliott MA was present the entire duration of the patient interaction and gynecological exam.\par

## 2023-03-23 NOTE — CONSULT LETTER
none [Dear  ___] : Dear  [unfilled], [Consult Letter:] : I had the pleasure of evaluating your patient, [unfilled]. [Please see my note below.] : Please see my note below. [Consult Closing:] : Thank you very much for allowing me to participate in the care of this patient.  If you have any questions, please do not hesitate to contact me. [Sincerely,] : Sincerely, [DrDoug  ___] : Dr. GARCIA [DrDoug ___] : Dr. GARCIA [FreeTextEntry3] : Dr. Bri Shaikh

## 2023-09-12 ENCOUNTER — NON-APPOINTMENT (OUTPATIENT)
Age: 73
End: 2023-09-12

## 2023-09-26 ENCOUNTER — APPOINTMENT (OUTPATIENT)
Dept: RADIATION ONCOLOGY | Facility: CLINIC | Age: 73
End: 2023-09-26
Payer: MEDICARE

## 2023-09-26 ENCOUNTER — NON-APPOINTMENT (OUTPATIENT)
Age: 73
End: 2023-09-26

## 2023-09-26 VITALS
HEART RATE: 67 BPM | WEIGHT: 175 LBS | DIASTOLIC BLOOD PRESSURE: 73 MMHG | BODY MASS INDEX: 27.47 KG/M2 | RESPIRATION RATE: 16 BRPM | HEIGHT: 67 IN | OXYGEN SATURATION: 95 % | SYSTOLIC BLOOD PRESSURE: 139 MMHG

## 2023-09-26 PROCEDURE — 99214 OFFICE O/P EST MOD 30 MIN: CPT

## 2023-09-26 RX ORDER — CYANOCOBALAMIN (VITAMIN B-12) 100 MCG
100 TABLET ORAL
Refills: 0 | Status: ACTIVE | COMMUNITY

## 2023-09-26 RX ORDER — UBIDECARENONE/VIT E ACET 100MG-5
CAPSULE ORAL
Refills: 0 | Status: ACTIVE | COMMUNITY

## 2023-10-18 ENCOUNTER — NON-APPOINTMENT (OUTPATIENT)
Age: 73
End: 2023-10-18

## 2023-10-18 NOTE — REVIEW OF SYSTEMS
Recommended that she start a daily over-the-counter antihistamine to see if this helps with allergy like symptoms and headaches. [Negative] : Allergic/Immunologic [Constipation: Grade 0] : Constipation: Grade 0 [Diarrhea: Grade 0] : Diarrhea: Grade 0 [Edema Limbs: Grade 0] : Edema Limbs: Grade 0  [Fatigue: Grade 0] : Fatigue: Grade 0 [Localized Edema: Grade 0] : Localized Edema: Grade 0  [Neck Edema: Grade 0] : Neck Edema: Grade 0 [Urinary Incontinence: Grade 0] : Urinary Incontinence: Grade 0  [Dyspareunia: Grade 0] : Dyspareunia: Grade 0 [Genital Edema: Grade 0] : Genital Edema: Grade 0 [Vaginal Stricture: Grade 0] : Vaginal Stricture: Grade 0 [Vaginal Infection: Grade 0] : Vaginal Infection: Grade 0  [Pruritus: Grade 0] : Pruritus: Grade 0 [Skin Atrophy: Grade 0] : Skin Atrophy: Grade 0 [Skin Hyperpigmentation: Grade 0] : Skin Hyperpigmentation: Grade 0 [Skin Induration: Grade 0] : Skin Induration: Grade 0 [Dermatitis Radiation: Grade 0] : Dermatitis Radiation: Grade 0

## 2023-10-19 ENCOUNTER — APPOINTMENT (OUTPATIENT)
Dept: RHEUMATOLOGY | Facility: CLINIC | Age: 73
End: 2023-10-19
Payer: MEDICARE

## 2023-10-19 VITALS
WEIGHT: 179 LBS | SYSTOLIC BLOOD PRESSURE: 130 MMHG | BODY MASS INDEX: 30.56 KG/M2 | DIASTOLIC BLOOD PRESSURE: 38 MMHG | TEMPERATURE: 97.2 F | OXYGEN SATURATION: 96 % | HEIGHT: 64.17 IN | HEART RATE: 57 BPM

## 2023-10-19 DIAGNOSIS — Z79.83 LONG TERM (CURRENT) USE OF BISPHOSPHONATES: ICD-10-CM

## 2023-10-19 DIAGNOSIS — M81.0 AGE-RELATED OSTEOPOROSIS W/OUT CURRENT PATHOLOGICAL FRACTURE: ICD-10-CM

## 2023-10-19 PROCEDURE — 99214 OFFICE O/P EST MOD 30 MIN: CPT

## 2023-10-19 RX ADMIN — ZOLEDRONIC ACID 0 MG/100ML: 5 INJECTION, SOLUTION INTRAVENOUS at 00:00

## 2023-12-18 ENCOUNTER — APPOINTMENT (OUTPATIENT)
Dept: RHEUMATOLOGY | Facility: CLINIC | Age: 73
End: 2023-12-18
Payer: MEDICARE

## 2023-12-18 VITALS
OXYGEN SATURATION: 98 % | RESPIRATION RATE: 18 BRPM | HEART RATE: 70 BPM | DIASTOLIC BLOOD PRESSURE: 69 MMHG | SYSTOLIC BLOOD PRESSURE: 149 MMHG | TEMPERATURE: 97.9 F

## 2023-12-18 VITALS
DIASTOLIC BLOOD PRESSURE: 68 MMHG | RESPIRATION RATE: 18 BRPM | TEMPERATURE: 97.9 F | OXYGEN SATURATION: 96 % | HEART RATE: 63 BPM | SYSTOLIC BLOOD PRESSURE: 115 MMHG

## 2023-12-18 PROCEDURE — 96374 THER/PROPH/DIAG INJ IV PUSH: CPT

## 2023-12-18 RX ORDER — ZOLEDRONIC ACID 5 MG/100ML
5 INJECTION INTRAVENOUS
Qty: 0 | Refills: 0 | Status: COMPLETED | OUTPATIENT
Start: 2023-10-19

## 2023-12-18 NOTE — HISTORY OF PRESENT ILLNESS
[Denies] : Denies [No] : No [Yes] : Yes [Declined] : Declined [Informed consent documented in EHR.] : Informed consent documented in EHR. [Vitamin D] : Vitamin D [Bone Density/DEXA] : Bone Density/DEXA [Left upper extremity] : Left upper extremity [24g] : 24g [Start Time: ___] : Medication Start Time: [unfilled] [End Time: ___] : Medication End Time: [unfilled] [Medication Name: ___] : Medication Name: [unfilled] [Total Amount Administered: ___] : Total Amount Administered: [unfilled] [IV discontinued. Intact. No signs or symptoms of IV complications noted. Time: ___] : IV discontinued. Intact. No signs or symptoms of IV complications noted. Time: [unfilled] [Patient  instructed to seek medical attention with signs and symptoms of adverse effects] : Patient  instructed to seek medical attention with signs and symptoms of adverse effects [Patient left unit in no acute distress] : Patient left unit in no acute distress [Medications administered as ordered and tolerated well.] : Medications administered as ordered and tolerated well. [de-identified] : RPA

## 2024-03-20 PROBLEM — C53.0 ENDOCERVICAL ADENOCARCINOMA: Status: ACTIVE | Noted: 2019-05-10

## 2024-03-21 ENCOUNTER — APPOINTMENT (OUTPATIENT)
Dept: GYNECOLOGIC ONCOLOGY | Facility: CLINIC | Age: 74
End: 2024-03-21
Payer: MEDICARE

## 2024-03-21 VITALS
WEIGHT: 168 LBS | RESPIRATION RATE: 16 BRPM | BODY MASS INDEX: 28.68 KG/M2 | DIASTOLIC BLOOD PRESSURE: 72 MMHG | HEIGHT: 64 IN | OXYGEN SATURATION: 99 % | SYSTOLIC BLOOD PRESSURE: 122 MMHG

## 2024-03-21 DIAGNOSIS — C53.0 MALIGNANT NEOPLASM OF ENDOCERVIX: ICD-10-CM

## 2024-03-21 PROCEDURE — 99459 PELVIC EXAMINATION: CPT

## 2024-03-21 PROCEDURE — 99212 OFFICE O/P EST SF 10 MIN: CPT

## 2024-03-21 NOTE — END OF VISIT
[FreeTextEntry2] : Ava Brooks MA was present the entire duration of the patient interaction and gynecological exam. [FreeTextEntry3] : RTC in 1 year, or find new primary gynecologist for routine care Follow up with Dr. Cleaning in 6 months for last surveillance visit

## 2024-03-21 NOTE — ASSESSMENT
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 No evidence of disease.  She will complete surveillance this summer. We discussed that she needs to find a new gynecologist for routine gynecologic care. IF she has trouble I could see her again in 1 year until she is able to find a new gynecologist.  She will discuss pneumococcal vaccine with her PCP. Otherwise all up to date on preventative care.

## 2024-03-21 NOTE — PHYSICAL EXAM
[Chaperone Present] : A chaperone was present in the examining room during all aspects of the physical examination [FreeTextEntry1] : Ava Brooks MA was present the entire duration of the patient interaction and gynecological exam. [Absent] : Adnexa(ae): Absent [Normal] : Anus and perineum: Normal sphincter tone, no masses, no prolapse. [Ambulatory and capable of all self care but unable to carry out any work activities] : Status 2- Ambulatory and capable of all self care but unable to carry out any work activities. Up and about more than 50% of waking hours

## 2024-03-21 NOTE — REASON FOR VISIT
[FreeTextEntry1] : [facility] [place]. [phone]

## 2024-03-21 NOTE — HISTORY OF PRESENT ILLNESS
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 Patient also follows with . She returns to the office today for a year svl visit.   She reports no new gynecologic concerns. No vaginal bleeding or discharge.   Health Maintenance: Mammogram: 8/16/23 BI-RADS 2 benign  Colonoscopy: 2 normal in the past, not interested in another one DEXA: osteoporosis on treatment, will follow up per rheumatologist Vaccines: had shingles, COVID, influenza, but not the Pneumococcal vaccine

## 2024-06-07 ENCOUNTER — APPOINTMENT (OUTPATIENT)
Dept: VASCULAR SURGERY | Facility: CLINIC | Age: 74
End: 2024-06-07
Payer: MEDICARE

## 2024-06-07 VITALS
HEART RATE: 73 BPM | SYSTOLIC BLOOD PRESSURE: 142 MMHG | WEIGHT: 194 LBS | DIASTOLIC BLOOD PRESSURE: 80 MMHG | OXYGEN SATURATION: 97 % | HEIGHT: 64 IN | BODY MASS INDEX: 33.12 KG/M2

## 2024-06-07 PROCEDURE — 93970 EXTREMITY STUDY: CPT

## 2024-06-07 PROCEDURE — 99203 OFFICE O/P NEW LOW 30 MIN: CPT

## 2024-06-13 NOTE — PROCEDURE
[FreeTextEntry1] : 6/7/24 BLE venous duplex:  1. Right: no evidence of right lower extremity deep or superficial venous thrombosis. 2. Left: no evidence of left lower extremity deep or superficial venous thrombosis. 3. No evidence of venous reflux in the left lower extremity. 4. No evidence of venous reflux in the right lower extremity.

## 2024-06-13 NOTE — PHYSICAL EXAM
[2+] : left 2+ [Ankle Swelling (On Exam)] : present [Ankle Swelling Bilaterally] : bilaterally  [Ankle Swelling On The Left] : moderate [de-identified] : NAD. well appearing

## 2024-06-13 NOTE — HISTORY OF PRESENT ILLNESS
[FreeTextEntry1] : 72 yo female PMHx HLD, endocervical adenocarcinoma, presents for evaluation of BLE edema . Pt has had edema of legs for many years, recently becoming more painful. She feels the edema is worse towards the end of the day after standing. Pt has been using 20-30 mmHg compression stockings for over 3 months without significant improvement in pain or swelling. Pt denies hx of DVT. Denies recent fever or chills.

## 2024-06-13 NOTE — ASSESSMENT
[FreeTextEntry1] : 74 yo female with BLE edema. No venous insufficiency or DVT on duplex.   Pt counseled on results of duplex and above diagnosis. BLE UNNA boots applied  Will refer to tactile for lymphedema massage pump  Pt counseled to walk daily and elevate legs at night RTC in 1 week for BLE UNNA boots   A total of 30 minutes was spent with patient and coordinating care

## 2024-06-14 ENCOUNTER — APPOINTMENT (OUTPATIENT)
Dept: VASCULAR SURGERY | Facility: CLINIC | Age: 74
End: 2024-06-14
Payer: MEDICARE

## 2024-06-14 VITALS
HEIGHT: 64 IN | HEART RATE: 67 BPM | BODY MASS INDEX: 32.95 KG/M2 | WEIGHT: 193 LBS | SYSTOLIC BLOOD PRESSURE: 138 MMHG | DIASTOLIC BLOOD PRESSURE: 76 MMHG | OXYGEN SATURATION: 95 %

## 2024-06-14 DIAGNOSIS — R60.0 LOCALIZED EDEMA: ICD-10-CM

## 2024-06-14 PROCEDURE — 99213 OFFICE O/P EST LOW 20 MIN: CPT

## 2024-06-14 NOTE — HISTORY OF PRESENT ILLNESS
[FreeTextEntry1] : 6/7/24: 74 yo female PMHx HLD, endocervical adenocarcinoma, presents for evaluation of BLE edema . Pt has had edema of legs for many years, recently becoming more painful. She feels the edema is worse towards the end of the day after standing. Pt has been using 20-30 mmHg compression stockings for over 3 months without significant improvement in pain or swelling. Pt denies hx of DVT. Denies recent fever or chills.  6/14/24: Pt had BLE UNNA boots on for the past week. She feels improvement in her edema.

## 2024-06-14 NOTE — PHYSICAL EXAM
[2+] : left 2+ [Ankle Swelling (On Exam)] : present [Ankle Swelling Bilaterally] : bilaterally  [Ankle Swelling On The Left] : moderate [de-identified] : NAD. well appearing

## 2024-06-21 ENCOUNTER — APPOINTMENT (OUTPATIENT)
Dept: VASCULAR SURGERY | Facility: CLINIC | Age: 74
End: 2024-06-21
Payer: MEDICARE

## 2024-06-21 VITALS — OXYGEN SATURATION: 96 % | SYSTOLIC BLOOD PRESSURE: 140 MMHG | HEART RATE: 63 BPM | DIASTOLIC BLOOD PRESSURE: 76 MMHG

## 2024-06-21 DIAGNOSIS — R60.9 EDEMA, UNSPECIFIED: ICD-10-CM

## 2024-06-21 PROCEDURE — 99213 OFFICE O/P EST LOW 20 MIN: CPT

## 2024-06-21 NOTE — ASSESSMENT
[FreeTextEntry1] : 74 yo female with improvement in BLE edema with UNNA boots. No venous insufficiency or DVT on duplex.   Pt counseled on results of duplex and above diagnosis. Pt counseled to use compression stockings daily  Will refer to tactile for lymphedema massage pump  Pt counseled to walk daily and elevate legs at night RTC PRN if edema returns   A total of 30 minutes was spent with patient and coordinating care

## 2024-06-21 NOTE — PHYSICAL EXAM
[2+] : left 2+ [Ankle Swelling (On Exam)] : present [Ankle Swelling Bilaterally] : bilaterally  [Ankle Swelling On The Left] : moderate [de-identified] : NAD. well appearing

## 2024-06-21 NOTE — HISTORY OF PRESENT ILLNESS
[FreeTextEntry1] : 6/7/24: 72 yo female PMHx HLD, endocervical adenocarcinoma, presents for evaluation of BLE edema . Pt has had edema of legs for many years, recently becoming more painful. She feels the edema is worse towards the end of the day after standing. Pt has been using 20-30 mmHg compression stockings for over 3 months without significant improvement in pain or swelling. Pt denies hx of DVT. Denies recent fever or chills.  6/14/24: Pt had BLE UNNA boots on for the past week. She feels improvement in her edema.   6/21/24: Pt had BLE UNNA boots on for the past week. She feels improvement in her edema.

## 2024-08-16 NOTE — HISTORY OF PRESENT ILLNESS
[FreeTextEntry1] : 73 year old woman with endocervical adenocarcinoma (FIGO IB2) status post surgery and adjuvant radiation completed 7/2019.  Interval history: Met with Gigi Garrett 6/7/24  for  BLE edema: 6/7/24 BLE venous duplex: 1. Right: no evidence of right lower extremity deep or superficial venous thrombosis. 2. Left: no evidence of left lower extremity deep or superficial venous thrombosis. 3. No evidence of venous reflux in the left lower extremity. 4. No evidence of venous reflux in the right lower extremity. UNNA boots  Mammogram  breast pain, breast edema, arm edema, fatigue or weight loss  care [facility]

## 2024-08-16 NOTE — DISEASE MANAGEMENT
[Pathological] : TNM Stage: p [FreeTextEntry4] : adenocarcinoma of the endocervix [TTNM] : 1b1 [NTNM] : 0 [MTNM] : 0 [IB] : IB

## 2024-09-20 ENCOUNTER — APPOINTMENT (OUTPATIENT)
Dept: RADIATION ONCOLOGY | Facility: CLINIC | Age: 74
End: 2024-09-20
Payer: MEDICARE

## 2024-09-20 ENCOUNTER — NON-APPOINTMENT (OUTPATIENT)
Age: 74
End: 2024-09-20

## 2024-09-20 VITALS
RESPIRATION RATE: 16 BRPM | BODY MASS INDEX: 32.96 KG/M2 | SYSTOLIC BLOOD PRESSURE: 149 MMHG | HEART RATE: 64 BPM | DIASTOLIC BLOOD PRESSURE: 71 MMHG | WEIGHT: 192 LBS | OXYGEN SATURATION: 97 %

## 2024-09-20 DIAGNOSIS — C53.0 MALIGNANT NEOPLASM OF ENDOCERVIX: ICD-10-CM

## 2024-09-20 PROCEDURE — 99214 OFFICE O/P EST MOD 30 MIN: CPT

## 2024-09-20 NOTE — HISTORY OF PRESENT ILLNESS
[FreeTextEntry1] : 73 year old woman with endocervical adenocarcinoma (FIGO IB2) status post surgery and adjuvant radiation completed 7/2019.  Interval history: Met with Dr. Gigi Garrett 6/7/24 regarding bilateral lower extremity edema.  6/7/24 BLE venous duplex showed no evidence of DVT or venous reflux.  She was started on UNNA boots.  More recently, she has been diagnosed with slipped disks, and has been undergoing physical therapy for the past month with improvement of associated symptoms.    She has not undergone mammography this year, and is undecided regarding whether to continue screening breast imaging.  She also has not found gynecologist yet.

## 2024-09-20 NOTE — REVIEW OF SYSTEMS
[Fatigue: Grade 0] : Fatigue: Grade 0 [Genital Edema: Grade 0] : Genital Edema: Grade 0 [Vaginal Stricture: Grade 0] : Vaginal Stricture: Grade 0 [Vaginal Infection: Grade 0] : Vaginal Infection: Grade 0  [Dyspareunia: Grade 0] : Dyspareunia: Grade 0

## 2024-09-20 NOTE — PHYSICAL EXAM
[Extraocular Movements] : extraocular movements were intact [Sclera] : the sclera and conjunctiva were normal [Outer Ear] : the ears and nose were normal in appearance [Heart Rate And Rhythm] : heart rate and rhythm were normal [Heart Sounds] : normal S1 and S2 [Bowel Sounds] : normal bowel sounds [Abdomen Soft] : soft [Nondistended] : nondistended [Normal] : normal external genitalia [Normal Vaginal Cuff] : vaginal cuff without lesion or nodularity [Normal] : oriented to person, place and time, the affect was normal, the mood was normal and not anxious [de-identified] : Examination performed in the presence of a female chaperone.

## 2024-09-20 NOTE — PHYSICAL EXAM
[Extraocular Movements] : extraocular movements were intact [Sclera] : the sclera and conjunctiva were normal [Outer Ear] : the ears and nose were normal in appearance [Heart Rate And Rhythm] : heart rate and rhythm were normal [Heart Sounds] : normal S1 and S2 [Bowel Sounds] : normal bowel sounds [Abdomen Soft] : soft [Nondistended] : nondistended [Normal] : normal external genitalia [Normal Vaginal Cuff] : vaginal cuff without lesion or nodularity [Normal] : oriented to person, place and time, the affect was normal, the mood was normal and not anxious [de-identified] : Examination performed in the presence of a female chaperone.

## 2024-11-01 ENCOUNTER — APPOINTMENT (OUTPATIENT)
Dept: RHEUMATOLOGY | Facility: CLINIC | Age: 74
End: 2024-11-01
Payer: MEDICARE

## 2024-11-01 VITALS — OXYGEN SATURATION: 100 % | DIASTOLIC BLOOD PRESSURE: 65 MMHG | HEART RATE: 67 BPM | SYSTOLIC BLOOD PRESSURE: 145 MMHG

## 2024-11-01 DIAGNOSIS — Z79.83 LONG TERM (CURRENT) USE OF BISPHOSPHONATES: ICD-10-CM

## 2024-11-01 DIAGNOSIS — M81.0 AGE-RELATED OSTEOPOROSIS W/OUT CURRENT PATHOLOGICAL FRACTURE: ICD-10-CM

## 2024-11-01 PROCEDURE — 99214 OFFICE O/P EST MOD 30 MIN: CPT
